# Patient Record
Sex: MALE | Race: WHITE | NOT HISPANIC OR LATINO | Employment: FULL TIME | URBAN - METROPOLITAN AREA
[De-identification: names, ages, dates, MRNs, and addresses within clinical notes are randomized per-mention and may not be internally consistent; named-entity substitution may affect disease eponyms.]

---

## 2017-01-03 ENCOUNTER — ALLSCRIPTS OFFICE VISIT (OUTPATIENT)
Dept: OTHER | Facility: OTHER | Age: 37
End: 2017-01-03

## 2017-01-03 DIAGNOSIS — R07.89 OTHER CHEST PAIN: ICD-10-CM

## 2017-01-12 ENCOUNTER — HOSPITAL ENCOUNTER (OUTPATIENT)
Dept: NON INVASIVE DIAGNOSTICS | Facility: HOSPITAL | Age: 37
Discharge: HOME | End: 2017-01-12
Attending: INTERNAL MEDICINE
Payer: COMMERCIAL

## 2017-01-12 DIAGNOSIS — R07.89 OTHER CHEST PAIN: ICD-10-CM

## 2017-05-05 ENCOUNTER — ALLSCRIPTS OFFICE VISIT (OUTPATIENT)
Dept: OTHER | Facility: OTHER | Age: 37
End: 2017-05-05

## 2017-05-08 ENCOUNTER — ALLSCRIPTS OFFICE VISIT (OUTPATIENT)
Dept: OTHER | Facility: OTHER | Age: 37
End: 2017-05-08

## 2018-01-13 VITALS
SYSTOLIC BLOOD PRESSURE: 132 MMHG | HEIGHT: 66 IN | BODY MASS INDEX: 44.22 KG/M2 | OXYGEN SATURATION: 96 % | DIASTOLIC BLOOD PRESSURE: 70 MMHG | HEART RATE: 105 BPM | WEIGHT: 275.13 LBS

## 2018-01-13 VITALS
HEIGHT: 73 IN | SYSTOLIC BLOOD PRESSURE: 112 MMHG | WEIGHT: 262 LBS | HEART RATE: 94 BPM | DIASTOLIC BLOOD PRESSURE: 74 MMHG | BODY MASS INDEX: 34.72 KG/M2

## 2018-01-14 VITALS
HEART RATE: 83 BPM | HEIGHT: 73 IN | WEIGHT: 261 LBS | SYSTOLIC BLOOD PRESSURE: 134 MMHG | DIASTOLIC BLOOD PRESSURE: 82 MMHG | BODY MASS INDEX: 34.59 KG/M2

## 2018-02-16 ENCOUNTER — OFFICE VISIT (OUTPATIENT)
Dept: CARDIOLOGY CLINIC | Facility: CLINIC | Age: 38
End: 2018-02-16
Payer: COMMERCIAL

## 2018-02-16 VITALS
SYSTOLIC BLOOD PRESSURE: 122 MMHG | HEIGHT: 73 IN | DIASTOLIC BLOOD PRESSURE: 82 MMHG | HEART RATE: 84 BPM | BODY MASS INDEX: 37.93 KG/M2 | OXYGEN SATURATION: 98 % | WEIGHT: 286.2 LBS

## 2018-02-16 DIAGNOSIS — R07.89 ATYPICAL CHEST PAIN: ICD-10-CM

## 2018-02-16 DIAGNOSIS — E66.9 OBESITY (BMI 35.0-39.9 WITHOUT COMORBIDITY): ICD-10-CM

## 2018-02-16 DIAGNOSIS — F41.9 ANXIETY: ICD-10-CM

## 2018-02-16 DIAGNOSIS — R00.0 TACHYCARDIA: Primary | ICD-10-CM

## 2018-02-16 PROCEDURE — 99213 OFFICE O/P EST LOW 20 MIN: CPT | Performed by: INTERNAL MEDICINE

## 2018-02-16 PROCEDURE — 93000 ELECTROCARDIOGRAM COMPLETE: CPT | Performed by: INTERNAL MEDICINE

## 2018-02-16 RX ORDER — FEBUXOSTAT 80 MG/1
1 TABLET, FILM COATED ORAL DAILY
COMMUNITY
Start: 2017-01-03

## 2018-02-16 NOTE — PROGRESS NOTES
Progress Note - Cardiology Office  Ariadna Pyle 42 y o  male MRN: [id number]   Encounter: [id number]    Assessment/Plan   1  Atypical noncoronary chest pain/neck pain which is most likely secondary to patient started lifting weights and is more of a chest soreness than any chest pain  No shortness of breath or any other symptoms associated with  Less likely to be cardiac in and isn't in view of maximal echo stress EKG test  Patient educated about chest pain associated with cardiac conditions  2  Tachycardia  Most likely anxiety driven and change in lifestyle  Patient counseled to lose weight and patient is monitoring his heart rate himself  3  Health maintenance and family history of coronary artery disease  Patient's cholesterol, HbA1c and TSH and other labs are reviewed with him  Lifestyle changes recommended including losing weight and healthy eating  4  Anxiety  As per PMD  5  Obesity  Patient has BMI of 37  Advised to lose weight  Discussed at length all his questions answered  Follow-up in 1 year or as needed    Counseling :  A description of the counseling  Patient was given instructions about chest pain, obesity, educated about angina  All his questions answered  Goals and Barriers  Want to lose 20-30 lb  There is no barrier  Patient's ability to self care: Yes  Medication side effect reviewed with patient in detail and all their questions answered to their satisfaction  HPI :     Ariadna Pyle is a 42y o  year old male who came for follow up  Patient has a past medical history significant for chest pain status post maximally negative exercise stress test in May 2016, history of gout, anxiety who is here for follow-up and he noted his heart rate was high  He recently started lifting weights and has chest soreness but no shortness of breath or any other significant complaint  He had history of anxiety problems and even had a panic attack last year  Currently he is feeling better   He admits is back to drinking now 1-2 beers a day which he promises to decrease  No other significant complaint  5/8/2017Patient has been doing well  No more chest pain  Did not do Holter monitor, but patient bought himself an apple watch, to monitor the heart  Says heart rate remains 70 to 80s  With exercise goes to 130 to 140  Try to lose weight  Denies any chest pain or any shortness of breath, no fever, no chills, no other significant complaint   02/16/2018  Patient came for follow-up  He is doing much better  He was working 10-12 hours a day  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  Having some neck pain  Sometime it goes to chest but no shortness of breath  He can lift heavy loads without any problems  No PND no orthopnea no nausea no vomiting  He is less anxious  Able to lose weight  No other significant complaint today  Review of Systems   Musculoskeletal: Positive for neck pain and neck stiffness  Psychiatric/Behavioral: The patient is nervous/anxious  All other systems reviewed and are negative  Historical Information   Past Medical History:   Diagnosis Date    Tachycardia      Past Surgical History:   Procedure Laterality Date    EYE SURGERY      HERNIA REPAIR       History   Alcohol Use    Yes     Comment: occasional      History   Drug use: Unknown     History   Smoking Status    Former Smoker   Smokeless Tobacco    Never Used     Family History:   Family History   Problem Relation Age of Onset    Heart attack Father        Meds/Allergies     Allergies   Allergen Reactions    Sulfamethoxazole-Trimethoprim        Current Outpatient Prescriptions:     febuxostat (ULORIC) 80 MG TABS, Take 1 tablet by mouth daily, Disp: , Rfl:     Vitals: Blood pressure 122/82, pulse 84, height 6' 1" (1 854 m), weight 130 kg (286 lb 3 2 oz), SpO2 98 %  Body mass index is 37 76 kg/m²      BP Readings from Last 3 Encounters:   02/16/18 122/82   05/08/17 112/74   05/05/17 134/82 Physical Exam:  Physical Exam    Neurologic:  Alert & oriented x 3, no new focal deficits, Not in any acute distress,  Constitutional:  Well developed, well nourished, non-toxic appearance   Eyes:  Pupil equal and reacting to light, conjunctiva normal, No JVP, No LNP   HENT:  Atraumatic, oropharynx moist, Neck- normal range of motion, no tenderness,  Neck supple   Respiratory:  Bilateral air entry, mostly clear to auscultation  Cardiovascular: S1-S2 regular with no gallops or murmur  GI:  Soft, nondistended, normal bowel sounds, nontender, no hepatosplenomegaly appreciated  Musculoskeletal:  No edema, no tenderness, no deformities  Skin:  Well hydrated, no rash   Lymphatic:  No lymphadenopathy noted   Extremities:  No edema and distal pulses are present    Diagnostic Studies Review Cardio:    Lab Review: He has a blood test done in May 2015 which shows cholesterol 158 and HDL 44 and  and asked to be A1c was 5 7 and his LFTs were acceptable  At that time his TSH was 1 25 and vitamin D 39  Uric acid 4 8   Stress Test: Stress test done in February 2016 was maximally and active with excellent access capacity  Echocardiogram/RICHARD: Echocardiogram 6/22/2016 shows normal LV systolic function EF 97-57%  Trace MR and PA pressure 35 40 mmHg  ECG Report:   Comparison to prior ECGs: there was an interval change in the ECG  Sinus tachycardia heart rate 105 bpm  No other significant change  As compared to old EKG heart rate is fast   Repeat EKG 02/16/2018 shows normal sinus rhythm heart rate 82 beats per minute  No significant ST changes  Dr German Ramey MD [facility] [place]      "This note has been constructed using a voice recognition system  Therefore there may be syntax, spelling, and/or grammatical errors   Please call if you have any questions  "

## 2018-02-16 NOTE — PATIENT INSTRUCTIONS
Angina   AMBULATORY CARE:   Angina  is pain, pressure, or tightness that is usually felt in your chest  It is caused by decreased blood flow and oxygen to your heart  Angina is usually caused by atherosclerosis (hardening of the arteries)  Chest pain may come on when you are stressed or do physical activities, such as walking or exercising  If left untreated, angina may get worse, increase your risk for a heart attack, or become life-threatening  Common symptoms include the following:   · Pressure, tightness, or pain in your neck, jaw, shoulder, or back    · Pain or numbness in either arm    · Discomfort that feels like heartburn    · Shortness of breath, sweating, nausea, or lightheadedness  Call 911 if:   · You have any of the following signs of a heart attack:      ¨ Squeezing, pressure, or pain in your chest that lasts longer than 5 minutes or returns    ¨ Discomfort or pain in your back, neck, jaw, stomach, or arm     ¨ Trouble breathing    ¨ Nausea or vomiting    ¨ Lightheadedness or a sudden cold sweat, especially with chest pain or trouble breathing    · You have chest pain that does not go away after you take medicine as directed  · You lose feeling in your face, arms, or legs, or you suddenly feel weak  Seek care immediately if:   · Your angina is happening more frequently, lasting longer, or causing worse pain  Contact your healthcare provider if:   · You are dizzy or nauseated after you take your medicine  · You have shortness of breath at rest     · You have new or worse swelling in your feet or ankles  · You have questions or concerns about your condition or care  Treatment for angina  may include any of the following  Take your medicine as directed  Call your healthcare provider if you think your medicine is not helping or if you have side effects  Tell him if you are allergic to any medicine  Keep a list of the medicines, vitamins, and herbs you take   Include the amounts, and when and why you take them  Bring the list or the pill bottles to follow-up visits  Carry your medicine list with you in case of an emergency  · Antiplatelets , such as aspirin, help prevent blood clots  Take your antiplatelet medicine exactly as directed  These medicines make it more likely for you to bleed or bruise  If you are told to take aspirin, do not take acetaminophen or ibuprofen instead  · Blood thinners  keep clots from forming in your blood  Clots may cause heart attacks, strokes, or death  This medicine makes it more likely for you to bleed or bruise  · Other medicines  may be given to open the arteries to your heart, slow your heartbeat, or decrease your blood pressure or cholesterol  · Do not take certain medicines without asking your healthcare provider first   These include NSAIDs, herbal or vitamin supplements, or hormones (estrogen or progestin)  · Angioplasty and stenting  help open the coronary arteries and allow blood to flow to the heart  Ask for more information about these procedures  · Coronary artery bypass graft (CABG) , or open heart surgery, can improve blood flow to the heart  This will help decrease your chest pain and prevent a heart attack  Manage angina:   · Do not smoke  Nicotine and other chemicals in cigarettes and cigars can cause heart and lung damage  Ask your healthcare provider for information if you currently smoke and need help to quit  E-cigarettes or smokeless tobacco still contain nicotine  Talk to your healthcare provider before you use these products  · Maintain a healthy weight  When you weigh more than is healthy for you, your heart must work harder  You are at higher risk for serious health problems if you are overweight  Ask your healthcare provider how much you should weigh  Ask him to help you create a weight loss plan if you are overweight  · Ask about activity  Your healthcare provider will tell you which activities to limit or avoid  Ask about the best exercise plan for you  · Eat heart-healthy foods  Include fresh fruits and vegetables in your meal plan  Choose low-fat foods, such as skim or 1% fat milk, low-fat cheese and yogurt, fish, chicken (without skin), and lean meats  Eat two 4-ounce servings of fish high in omega-3 fats each week, such as salmon, fresh tuna, and herring  Do not eat foods that are high in sodium, such as canned foods, potato chips, salty snacks, and cold cuts  Put less table salt on your food  · Avoid activities that cause angina  Pay attention to your symptoms and find out what seems to make your angina worse  · Ask if you should have a flu vaccine  The flu vaccine will decrease your risk for an infection  An infection can put more stress on your heart and worsen your angina  Follow up with your healthcare provider as directed:  Write down your questions so you remember to ask them during your visits  © 2017 [employer]  Information is for End User's use only and may not be sold, redistributed or otherwise used for commercial purposes  All illustrations and images included in CareNotes® are the copyrighted property of [employer]  or [employer]  The above information is an  only  It is not intended as medical advice for individual conditions or treatments  Talk to your doctor, nurse or pharmacist before following any medical regimen to see if it is safe and effective for you  Obesity   AMBULATORY CARE:   Obesity  is when your body mass index (BMI) is greater than 30  Your healthcare provider will use your height and weight to measure your BMI  The risks of obesity include  many health problems, such as injuries or physical disability   You may need tests to check for the following:  · Diabetes     · High blood pressure or high cholesterol     · Heart disease     · Gallbladder or liver disease     · Cancer of the colon, breast, prostate, liver, or kidney · Sleep apnea     · Arthritis or gout  Seek care immediately if:   · You have a severe headache, confusion, or difficulty speaking  · You have weakness on one side of your body  · You have chest pain, sweating, or shortness of breath  Contact your healthcare provider if:   · You have symptoms of gallbladder or liver disease, such as pain in your upper abdomen  · You have knee or hip pain and discomfort while walking  · You have symptoms of diabetes, such as intense hunger and thirst, and frequent urination  · You have symptoms of sleep apnea, such as snoring or daytime sleepiness  · You have questions or concerns about your condition or care  Treatment for obesity  focuses on helping you lose weight to improve your health  Even a small decrease in BMI can reduce the risk for many health problems  Your healthcare provider will help you set a weight-loss goal   · Lifestyle changes  are the first step in treating obesity  These include making healthy food choices and getting regular physical activity  Your healthcare provider may suggest a weight-loss program that involves coaching, education, and therapy  · Medicine  may help you lose weight when it is used with a healthy diet and physical activity  · Surgery  can help you lose weight if you are very obese and have other health problems  There are several types of weight-loss surgery  Ask your healthcare provider for more information  Be successful losing weight:   · Set small, realistic goals  An example of a small goal is to walk for 20 minutes 5 days a week  Anther goal is to lose 5% of your body weight  · Tell friends, family members, and coworkers about your goals  and ask for their support  Ask a friend to lose weight with you, or join a weight-loss support group  · Identify foods or triggers that may cause you to overeat , and find ways to avoid them  Remove tempting high-calorie foods from your home and workplace  Place a bowl of fresh fruit on your kitchen counter  If stress causes you to eat, then find other ways to cope with stress  · Keep a diary to track what you eat and drink  Also write down how many minutes of physical activity you do each day  Weigh yourself once a week and record it in your diary  Eating changes: You will need to eat 500 to 1,000 fewer calories each day than you currently eat to lose 1 to 2 pounds a week  The following changes will help you cut calories:  · Eat smaller portions  Use small plates, no larger than 9 inches in diameter  Fill your plate half full of fruits and vegetables  Measure your food using measuring cups until you know what a serving size looks like  · Eat 3 meals and 1 or 2 snacks each day  Plan your meals in advance  Isabel Perea and eat at home most of the time  Eat slowly  · Eat fruits and vegetables at every meal   They are low in calories and high in fiber, which makes you feel full  Do not add butter, margarine, or cream sauce to vegetables  Use herbs to season steamed vegetables  · Eat less fat and fewer fried foods  Eat more baked or grilled chicken and fish  These protein sources are lower in calories and fat than red meat  Limit fast food  Dress your salads with olive oil and vinegar instead of bottled dressing  · Limit the amount of sugar you eat  Do not drink sugary beverages  Limit alcohol  Activity changes:  Physical activity is good for your body in many ways  It helps you burn calories and build strong muscles  It decreases stress and depression, and improves your mood  It can also help you sleep better  Talk to your healthcare provider before you begin an exercise program   · Exercise for at least 30 minutes 5 days a week  Start slowly  Set aside time each day for physical activity that you enjoy and that is convenient for you   It is best to do both weight training and an activity that increases your heart rate, such as walking, bicycling, or swimming  · Find ways to be more active  Do yard work and housecleaning  Walk up the stairs instead of using elevators  Spend your leisure time going to events that require walking, such as outdoor festivals or fairs  This extra physical activity can help you lose weight and keep it off  Follow up with your healthcare provider as directed: You may need to meet with a dietitian  Write down your questions so you remember to ask them during your visits  © 2017 [employer] Information is for End User's use only and may not be sold, redistributed or otherwise used for commercial purposes  All illustrations and images included in CareNotes® are the copyrighted property of [employer]  or [employer]  The above information is an  only  It is not intended as medical advice for individual conditions or treatments  Talk to your doctor, nurse or pharmacist before following any medical regimen to see if it is safe and effective for you

## 2020-10-23 ENCOUNTER — HOSPITAL ENCOUNTER (EMERGENCY)
Facility: HOSPITAL | Age: 40
Discharge: HOME | End: 2020-10-23
Attending: EMERGENCY MEDICINE | Admitting: EMERGENCY MEDICINE
Payer: COMMERCIAL

## 2020-10-23 ENCOUNTER — APPOINTMENT (EMERGENCY)
Dept: CT IMAGING | Facility: HOSPITAL | Age: 40
End: 2020-10-23
Payer: COMMERCIAL

## 2020-10-23 VITALS
TEMPERATURE: 99.6 F | RESPIRATION RATE: 16 BRPM | OXYGEN SATURATION: 97 % | BODY MASS INDEX: 38.25 KG/M2 | SYSTOLIC BLOOD PRESSURE: 134 MMHG | HEART RATE: 90 BPM | WEIGHT: 289.9 LBS | DIASTOLIC BLOOD PRESSURE: 84 MMHG

## 2020-10-23 DIAGNOSIS — R31.9 HEMATURIA: Primary | ICD-10-CM

## 2020-10-23 DIAGNOSIS — N39.0 UTI (URINARY TRACT INFECTION): ICD-10-CM

## 2020-10-23 LAB
ALBUMIN SERPL BCP-MCNC: 4.1 G/DL (ref 3.5–5)
ALP SERPL-CCNC: 69 U/L (ref 46–116)
ALT SERPL W P-5'-P-CCNC: 32 U/L (ref 12–78)
ANION GAP SERPL CALCULATED.3IONS-SCNC: 10 MMOL/L (ref 4–13)
AST SERPL W P-5'-P-CCNC: 27 U/L (ref 5–45)
BACTERIA UR QL AUTO: ABNORMAL /HPF
BASOPHILS # BLD AUTO: 0.02 THOUSANDS/ΜL (ref 0–0.1)
BASOPHILS NFR BLD AUTO: 0 % (ref 0–1)
BILIRUB SERPL-MCNC: 0.64 MG/DL (ref 0.2–1)
BILIRUB UR QL STRIP: NEGATIVE
BUN SERPL-MCNC: 16 MG/DL (ref 5–25)
CALCIUM SERPL-MCNC: 8.7 MG/DL (ref 8.3–10.1)
CHLORIDE SERPL-SCNC: 102 MMOL/L (ref 100–108)
CLARITY UR: ABNORMAL
CO2 SERPL-SCNC: 29 MMOL/L (ref 21–32)
COLOR UR: YELLOW
CREAT SERPL-MCNC: 1.32 MG/DL (ref 0.6–1.3)
EOSINOPHIL # BLD AUTO: 0.03 THOUSAND/ΜL (ref 0–0.61)
EOSINOPHIL NFR BLD AUTO: 0 % (ref 0–6)
ERYTHROCYTE [DISTWIDTH] IN BLOOD BY AUTOMATED COUNT: 12.7 % (ref 11.6–15.1)
GFR SERPL CREATININE-BSD FRML MDRD: 66 ML/MIN/1.73SQ M
GLUCOSE SERPL-MCNC: 108 MG/DL (ref 65–140)
GLUCOSE UR STRIP-MCNC: NEGATIVE MG/DL
HCT VFR BLD AUTO: 43.4 % (ref 36.5–49.3)
HGB BLD-MCNC: 14.5 G/DL (ref 12–17)
HGB UR QL STRIP.AUTO: ABNORMAL
IMM GRANULOCYTES # BLD AUTO: 0.07 THOUSAND/UL (ref 0–0.2)
IMM GRANULOCYTES NFR BLD AUTO: 1 % (ref 0–2)
KETONES UR STRIP-MCNC: NEGATIVE MG/DL
LEUKOCYTE ESTERASE UR QL STRIP: ABNORMAL
LIPASE SERPL-CCNC: 81 U/L (ref 73–393)
LYMPHOCYTES # BLD AUTO: 1.85 THOUSANDS/ΜL (ref 0.6–4.47)
LYMPHOCYTES NFR BLD AUTO: 13 % (ref 14–44)
MCH RBC QN AUTO: 31 PG (ref 26.8–34.3)
MCHC RBC AUTO-ENTMCNC: 33.4 G/DL (ref 31.4–37.4)
MCV RBC AUTO: 93 FL (ref 82–98)
MONOCYTES # BLD AUTO: 2 THOUSAND/ΜL (ref 0.17–1.22)
MONOCYTES NFR BLD AUTO: 15 % (ref 4–12)
NEUTROPHILS # BLD AUTO: 9.79 THOUSANDS/ΜL (ref 1.85–7.62)
NEUTS SEG NFR BLD AUTO: 71 % (ref 43–75)
NITRITE UR QL STRIP: POSITIVE
NON-SQ EPI CELLS URNS QL MICRO: ABNORMAL /HPF
NRBC BLD AUTO-RTO: 0 /100 WBCS
PH UR STRIP.AUTO: 5.5 [PH]
PLATELET # BLD AUTO: 159 THOUSANDS/UL (ref 149–390)
PMV BLD AUTO: 11.1 FL (ref 8.9–12.7)
POTASSIUM SERPL-SCNC: 3.9 MMOL/L (ref 3.5–5.3)
PROT SERPL-MCNC: 7.3 G/DL (ref 6.4–8.2)
PROT UR STRIP-MCNC: ABNORMAL MG/DL
RBC # BLD AUTO: 4.68 MILLION/UL (ref 3.88–5.62)
RBC #/AREA URNS AUTO: ABNORMAL /HPF
SODIUM SERPL-SCNC: 141 MMOL/L (ref 136–145)
SP GR UR STRIP.AUTO: >=1.03 (ref 1–1.03)
UROBILINOGEN UR QL STRIP.AUTO: 0.2 E.U./DL
WBC # BLD AUTO: 13.76 THOUSAND/UL (ref 4.31–10.16)
WBC #/AREA URNS AUTO: ABNORMAL /HPF

## 2020-10-23 PROCEDURE — 85025 COMPLETE CBC W/AUTO DIFF WBC: CPT | Performed by: EMERGENCY MEDICINE

## 2020-10-23 PROCEDURE — 87591 N.GONORRHOEAE DNA AMP PROB: CPT | Performed by: EMERGENCY MEDICINE

## 2020-10-23 PROCEDURE — 81001 URINALYSIS AUTO W/SCOPE: CPT | Performed by: EMERGENCY MEDICINE

## 2020-10-23 PROCEDURE — 74176 CT ABD & PELVIS W/O CONTRAST: CPT

## 2020-10-23 PROCEDURE — 87077 CULTURE AEROBIC IDENTIFY: CPT | Performed by: EMERGENCY MEDICINE

## 2020-10-23 PROCEDURE — 87186 SC STD MICRODIL/AGAR DIL: CPT | Performed by: EMERGENCY MEDICINE

## 2020-10-23 PROCEDURE — 99284 EMERGENCY DEPT VISIT MOD MDM: CPT

## 2020-10-23 PROCEDURE — 99284 EMERGENCY DEPT VISIT MOD MDM: CPT | Performed by: EMERGENCY MEDICINE

## 2020-10-23 PROCEDURE — G1004 CDSM NDSC: HCPCS

## 2020-10-23 PROCEDURE — 87086 URINE CULTURE/COLONY COUNT: CPT | Performed by: EMERGENCY MEDICINE

## 2020-10-23 PROCEDURE — 87491 CHLMYD TRACH DNA AMP PROBE: CPT | Performed by: EMERGENCY MEDICINE

## 2020-10-23 PROCEDURE — 83690 ASSAY OF LIPASE: CPT | Performed by: EMERGENCY MEDICINE

## 2020-10-23 PROCEDURE — 80053 COMPREHEN METABOLIC PANEL: CPT | Performed by: EMERGENCY MEDICINE

## 2020-10-23 PROCEDURE — 36415 COLL VENOUS BLD VENIPUNCTURE: CPT | Performed by: EMERGENCY MEDICINE

## 2020-10-23 RX ORDER — TESTOSTERONE CYPIONATE 100 MG/ML
200 INJECTION, SOLUTION INTRAMUSCULAR ONCE
COMMUNITY

## 2020-10-23 RX ORDER — MELATONIN
2000 DAILY
COMMUNITY

## 2020-10-23 RX ORDER — CEPHALEXIN 250 MG/1
500 CAPSULE ORAL ONCE
Status: COMPLETED | OUTPATIENT
Start: 2020-10-23 | End: 2020-10-23

## 2020-10-23 RX ORDER — MULTIVIT WITH MINERALS/LUTEIN
1000 TABLET ORAL DAILY
COMMUNITY

## 2020-10-23 RX ORDER — CEPHALEXIN 500 MG/1
500 CAPSULE ORAL EVERY 6 HOURS SCHEDULED
Qty: 28 CAPSULE | Refills: 0 | Status: SHIPPED | OUTPATIENT
Start: 2020-10-23 | End: 2020-10-30

## 2020-10-23 RX ADMIN — CEPHALEXIN 500 MG: 250 CAPSULE ORAL at 19:59

## 2020-10-25 LAB
BACTERIA UR CULT: ABNORMAL
C TRACH DNA SPEC QL NAA+PROBE: NEGATIVE
N GONORRHOEA DNA SPEC QL NAA+PROBE: NEGATIVE

## 2020-12-10 ENCOUNTER — TRANSCRIBE ORDERS (OUTPATIENT)
Dept: ADMINISTRATIVE | Facility: HOSPITAL | Age: 40
End: 2020-12-10

## 2020-12-10 DIAGNOSIS — N28.9 KIDNEY FUNCTION ABNORMAL: Primary | ICD-10-CM

## 2020-12-12 ENCOUNTER — HOSPITAL ENCOUNTER (OUTPATIENT)
Dept: ULTRASOUND IMAGING | Facility: HOSPITAL | Age: 40
Discharge: HOME | End: 2020-12-12
Payer: COMMERCIAL

## 2020-12-12 DIAGNOSIS — N28.9 KIDNEY FUNCTION ABNORMAL: ICD-10-CM

## 2020-12-12 PROCEDURE — 76770 US EXAM ABDO BACK WALL COMP: CPT

## 2021-03-30 DIAGNOSIS — Z23 ENCOUNTER FOR IMMUNIZATION: ICD-10-CM

## 2022-01-03 PROCEDURE — 87636 SARSCOV2 & INF A&B AMP PRB: CPT | Performed by: INTERNAL MEDICINE

## 2022-04-12 ENCOUNTER — APPOINTMENT (OUTPATIENT)
Dept: RADIOLOGY | Facility: MEDICAL CENTER | Age: 42
End: 2022-04-12
Payer: COMMERCIAL

## 2022-04-12 VITALS
SYSTOLIC BLOOD PRESSURE: 149 MMHG | DIASTOLIC BLOOD PRESSURE: 88 MMHG | HEIGHT: 73 IN | WEIGHT: 303 LBS | HEART RATE: 105 BPM | BODY MASS INDEX: 40.16 KG/M2

## 2022-04-12 DIAGNOSIS — M54.50 LOW BACK PAIN, UNSPECIFIED BACK PAIN LATERALITY, UNSPECIFIED CHRONICITY, UNSPECIFIED WHETHER SCIATICA PRESENT: ICD-10-CM

## 2022-04-12 DIAGNOSIS — M54.50 LEFT LOW BACK PAIN, UNSPECIFIED CHRONICITY, UNSPECIFIED WHETHER SCIATICA PRESENT: Primary | ICD-10-CM

## 2022-04-12 PROCEDURE — 99203 OFFICE O/P NEW LOW 30 MIN: CPT | Performed by: PHYSICIAN ASSISTANT

## 2022-04-12 PROCEDURE — 72114 X-RAY EXAM L-S SPINE BENDING: CPT

## 2022-04-12 RX ORDER — PREDNISONE 10 MG/1
TABLET ORAL
Qty: 42 TABLET | Refills: 0 | Status: SHIPPED | OUTPATIENT
Start: 2022-04-12

## 2022-04-12 NOTE — PROGRESS NOTES
Patient Name:  Raya Alexander  MRN:  [id number]    Assessment & Plan     Left-sided lumbar spine pain with lumbar radiculitis  1  Referral to physical therapy  2  Prescription for prednisone taper  3  The knee activities as tolerated with modification to avoid pain  4  Follow-up in six weeks with primary care sports medicine  Chief Complaint     Low back pain    History of the Present Illness     Raya Alexander is a 35 y o  male who reports to the office today for evaluation of his lumbar spine  He notes an onset of pain approximately six months ago  He denies any specific injury or trauma  He does work in construction  He notes primarily left-sided axial lumbar spine pain  He states the pain worsens as the day progresses  He denies any radiation of the pain distally into the lower extremities  He does note altered sensation which radiates distally into the toes of the left lower extremity  He denies any weakness in the lower extremities  He denies any bowel or bladder dysfunction  No saddle paresthesias  He was prescribed naproxen initially by his PCP which did result in GI upset  No fevers or chills  Physical Exam     /88   Pulse 105   Ht 6' 1" (1 854 m)   Wt (!) 137 kg (303 lb)   BMI 39 98 kg/m²     Lumbar spine:  No gross deformity  No tenderness to palpation midline and paraspinal musculature  No tenderness to palpation bilateral SI joints  No tenderness to palpation piriformis musculature bilaterally  Sensation intact L2-S1 bilaterally with 5/5 strength  Negative straight leg raise bilaterally  Eyes: Anicteric sclerae  ENT: Trachea midline  Lungs: Normal respiratory effort  CV: Capillary refill is less than 2 seconds  Skin: Intact without erythema  Lymph: No palpable lymphadenopathy  Neuro: Sensation is grossly intact to light touch  Psych: Mood and affect are appropriate      Data Review     I have personally reviewed pertinent films in PACS, and my interpretation follows:    X-rays lumbar spine 4/12/22:  No acute osseous abnormalities  No fracture or spondylolisthesis evident  Multilevel degenerative changes most appreciated at L4-L5 and L5-S1  Past Medical History:   Diagnosis Date    Tachycardia        Past Surgical History:   Procedure Laterality Date    EYE SURGERY      HERNIA REPAIR         Allergies   Allergen Reactions    Sulfamethoxazole-Trimethoprim        Current Outpatient Medications on File Prior to Visit   Medication Sig Dispense Refill    Ascorbic Acid (vitamin C) 1000 MG tablet Take 1,000 mg by mouth daily      cholecalciferol (VITAMIN D3) 1,000 units tablet Take 2,000 Units by mouth daily      febuxostat (ULORIC) 80 MG TABS Take 1 tablet by mouth daily      testosterone cypionate (DEPO-TESTOSTERONE) 100 mg/mL IM injection Inject 200 mg into a muscle once One injection every 2 weeks  No current facility-administered medications on file prior to visit  Social History     Tobacco Use    Smoking status: Former Smoker    Smokeless tobacco: Never Used   Substance Use Topics    Alcohol use: Yes     Comment: occasional     Drug use: Never       Family History   Problem Relation Age of Onset    Heart attack Father     Heart attack Family        Review of Systems     As stated in the HPI  All other systems reviewed and are negative

## 2022-10-07 ENCOUNTER — OFFICE VISIT (OUTPATIENT)
Dept: FAMILY MEDICINE CLINIC | Facility: CLINIC | Age: 42
End: 2022-10-07
Payer: COMMERCIAL

## 2022-10-07 VITALS — HEART RATE: 98 BPM | BODY MASS INDEX: 38.76 KG/M2 | HEIGHT: 74 IN | OXYGEN SATURATION: 94 % | WEIGHT: 302 LBS

## 2022-10-07 DIAGNOSIS — M54.50 ACUTE LEFT-SIDED LOW BACK PAIN WITHOUT SCIATICA: ICD-10-CM

## 2022-10-07 DIAGNOSIS — Z13.29 SCREENING FOR THYROID DISORDER: ICD-10-CM

## 2022-10-07 DIAGNOSIS — M54.50 LEFT LOW BACK PAIN, UNSPECIFIED CHRONICITY, UNSPECIFIED WHETHER SCIATICA PRESENT: ICD-10-CM

## 2022-10-07 DIAGNOSIS — Z13.0 SCREENING FOR DEFICIENCY ANEMIA: ICD-10-CM

## 2022-10-07 DIAGNOSIS — E78.5 DYSLIPIDEMIA: ICD-10-CM

## 2022-10-07 DIAGNOSIS — M1A.09X0 CHRONIC GOUT OF MULTIPLE SITES, UNSPECIFIED CAUSE: Primary | ICD-10-CM

## 2022-10-07 DIAGNOSIS — Z13.1 SCREENING FOR DIABETES MELLITUS: ICD-10-CM

## 2022-10-07 DIAGNOSIS — N28.9 RENAL INSUFFICIENCY: ICD-10-CM

## 2022-10-07 DIAGNOSIS — E66.9 OBESITY (BMI 35.0-39.9 WITHOUT COMORBIDITY): ICD-10-CM

## 2022-10-07 PROCEDURE — 99214 OFFICE O/P EST MOD 30 MIN: CPT | Performed by: INTERNAL MEDICINE

## 2022-10-07 RX ORDER — PREDNISONE 10 MG/1
TABLET ORAL
Qty: 42 TABLET | Refills: 0 | Status: SHIPPED | OUTPATIENT
Start: 2022-10-07

## 2022-10-07 NOTE — ASSESSMENT & PLAN NOTE
Patient with low back pain and on the left side with some discomfort feeling in the left lower extremity    Will prescribe him tapering doses of the prednisone to which he has responded in the past   Recommend also patient takes some Tylenol heating pad and follow up

## 2022-10-07 NOTE — PROGRESS NOTES
Office Visit Note  10/07/22     Bedford Lombard Febbo 38 y o  male MRN: [id number]  : 1980    Assessment:     1  Chronic gout of multiple sites, unspecified cause  Assessment & Plan:  Patient with history of gout currently taking Uloric 80 mg daily  He did not have any episodes of gout in the recent past   Recommend patient to get uric acid levels also done  2  Left low back pain, unspecified chronicity, unspecified whether sciatica present  -     predniSONE 10 mg tablet; Take 6 tablets days 1&2, 5 tablets days 3&4, 4 tablets days 5&6, 3 tablets days 7&8, 2 tablets days 9&10, 1 tablet days 11&12    3  Acute left-sided low back pain without sciatica  Assessment & Plan:  Patient with low back pain and on the left side with some discomfort feeling in the left lower extremity  Will prescribe him tapering doses of the prednisone to which he has responded in the past   Recommend also patient takes some Tylenol heating pad and follow up       4  Renal insufficiency  Assessment & Plan:  Patient's creatinine level is 1 37  Will follow up with repeat lab done  5  Screening for deficiency anemia  -     CBC and Platelet; Future    6  Screening for thyroid disorder  -     TSH, 3rd generation with Free T4 reflex; Future    7  Screening for diabetes mellitus  -     Comprehensive metabolic panel; Future  -     HEMOGLOBIN A1C W/ EAG ESTIMATION; Future    8  Dyslipidemia  -     Lipid panel; Future    9  Obesity (BMI 35 0-39 9 without comorbidity)  Assessment & Plan:  Patient's BMI is 38 77  Discussed with the patient weight reduction  Diet and exercise were discussed          Discussion Summary and Plan: Today's care plan and medications were reviewed with patient in detail and all their questions answered to their satisfaction      Chief Complaint   Patient presents with    Back Pain     Patient states he bent over and threw his back out      Subjective:  Patient has come here for evaluation regarding pain in the left side back causing some discomfort and pain in the left lower extremity also  Denies tingling numbness sensation in the lower extremities  History of back problem in the past also resolved with steroid medications orally  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  Patient denies chest pains palpitations shortness of breath  The following portions of the patient's history were reviewed and updated as appropriate: allergies, current medications, past family history, past medical history, past social history, past surgical history and problem list     Review of Systems   Constitutional: Negative for chills and fever  HENT: Negative for ear pain and sore throat  Eyes: Negative for pain and visual disturbance  Respiratory: Negative for cough and shortness of breath  Cardiovascular: Negative for chest pain and palpitations  Gastrointestinal: Negative for abdominal pain and vomiting  Genitourinary: Negative for dysuria and hematuria  Musculoskeletal: Positive for arthralgias and back pain  Skin: Negative for color change and rash  Neurological: Negative for seizures and syncope  All other systems reviewed and are negative          Historical Information   Patient Active Problem List   Diagnosis    Atypical chest pain    Anxiety    Tachycardia    Obesity (BMI 35 0-39 9 without comorbidity)    Chronic gout of multiple sites    Acute low back pain    Renal insufficiency     Past Medical History:   Diagnosis Date    Tachycardia      Past Surgical History:   Procedure Laterality Date    EYE SURGERY      HERNIA REPAIR       Social History     Substance and Sexual Activity   Alcohol Use Yes    Comment: occasional      Social History     Substance and Sexual Activity   Drug Use Never     Social History     Tobacco Use   Smoking Status Former Smoker   Smokeless Tobacco Never Used     Family History   Problem Relation Age of Onset    Heart attack Father     Heart attack Family      Health Maintenance Due   Topic    Hepatitis C Screening     PT PLAN OF CARE     Depression Screening     HIV Screening     BMI: Followup Plan     Annual Physical     DTaP,Tdap,and Td Vaccines (1 - Tdap)    Influenza Vaccine (1)      Meds/Allergies       Current Outpatient Medications:     Ascorbic Acid (vitamin C) 1000 MG tablet, Take 1,000 mg by mouth daily, Disp: , Rfl:     cholecalciferol (VITAMIN D3) 1,000 units tablet, Take 2,000 Units by mouth daily, Disp: , Rfl:     febuxostat (ULORIC) 80 MG TABS, Take 1 tablet by mouth daily, Disp: , Rfl:     predniSONE 10 mg tablet, Take 6 tablets days 1&2, 5 tablets days 3&4, 4 tablets days 5&6, 3 tablets days 7&8, 2 tablets days 9&10, 1 tablet days 11&12, Disp: 42 tablet, Rfl: 0    testosterone cypionate (DEPO-TESTOSTERONE) 100 mg/mL IM injection, Inject 200 mg into a muscle once One injection every 2 weeks  (Patient not taking: Reported on 10/7/2022), Disp: , Rfl:       Objective:    Vitals:   Pulse 98   Ht 6' 2" (1 88 m)   Wt (!) 137 kg (302 lb)   SpO2 94%   BMI 38 77 kg/m²   Body mass index is 38 77 kg/m²  Vitals:    10/07/22 1434   Weight: (!) 137 kg (302 lb)       Physical Exam  Vitals and nursing note reviewed  Constitutional:       Appearance: Normal appearance  Cardiovascular:      Rate and Rhythm: Normal rate and regular rhythm  Heart sounds: Normal heart sounds  Pulmonary:      Effort: Pulmonary effort is normal       Breath sounds: Normal breath sounds  Abdominal:      General: Abdomen is flat  Palpations: Abdomen is soft  Musculoskeletal:      Right lower leg: No edema  Left lower leg: No edema  Comments: Tenderness in the lumbar spine area SLR about 30° on the left side  No other focal deficits appreciated   Neurological:      Mental Status: He is alert and oriented to person, place, and time  Lab Review   No visits with results within 2 Month(s) from this visit     Latest known visit with results is:   Community Orders on 01/03/2022   Component Date Value Ref Range Status    SARS-CoV-2 01/03/2022 Positive (A) Negative Final    INFLUENZA A PCR 01/03/2022 Negative  Negative Final    INFLUENZA B PCR 01/03/2022 Negative  Negative Final         Sylvester Mora        "This note has been constructed using a voice recognition system  Therefore there may be syntax, spelling, and/or grammatical errors   Please call if you have any questions  "

## 2022-10-07 NOTE — ASSESSMENT & PLAN NOTE
Patient with history of gout currently taking Uloric 80 mg daily  He did not have any episodes of gout in the recent past   Recommend patient to get uric acid levels also done

## 2022-10-07 NOTE — ASSESSMENT & PLAN NOTE
Patient's BMI is 38 77  Discussed with the patient weight reduction    Diet and exercise were discussed

## 2023-02-20 ENCOUNTER — OFFICE VISIT (OUTPATIENT)
Dept: FAMILY MEDICINE CLINIC | Facility: CLINIC | Age: 43
End: 2023-02-20

## 2023-02-20 ENCOUNTER — TELEPHONE (OUTPATIENT)
Dept: CARDIOLOGY CLINIC | Facility: CLINIC | Age: 43
End: 2023-02-20

## 2023-02-20 VITALS
SYSTOLIC BLOOD PRESSURE: 124 MMHG | HEART RATE: 94 BPM | OXYGEN SATURATION: 97 % | BODY MASS INDEX: 40.43 KG/M2 | WEIGHT: 315 LBS | HEIGHT: 74 IN | DIASTOLIC BLOOD PRESSURE: 86 MMHG

## 2023-02-20 DIAGNOSIS — Z13.29 SCREENING FOR THYROID DISORDER: ICD-10-CM

## 2023-02-20 DIAGNOSIS — R73.03 PRE-DIABETES: ICD-10-CM

## 2023-02-20 DIAGNOSIS — E78.5 DYSLIPIDEMIA: ICD-10-CM

## 2023-02-20 DIAGNOSIS — E66.01 CLASS 3 SEVERE OBESITY DUE TO EXCESS CALORIES WITHOUT SERIOUS COMORBIDITY WITH BODY MASS INDEX (BMI) OF 40.0 TO 44.9 IN ADULT (HCC): ICD-10-CM

## 2023-02-20 DIAGNOSIS — Z13.0 SCREENING FOR DEFICIENCY ANEMIA: ICD-10-CM

## 2023-02-20 DIAGNOSIS — N28.9 RENAL INSUFFICIENCY: ICD-10-CM

## 2023-02-20 DIAGNOSIS — M1A.09X0 CHRONIC GOUT OF MULTIPLE SITES, UNSPECIFIED CAUSE: ICD-10-CM

## 2023-02-20 DIAGNOSIS — R07.89 ATYPICAL CHEST PAIN: Primary | ICD-10-CM

## 2023-02-20 DIAGNOSIS — E66.9 OBESITY (BMI 35.0-39.9 WITHOUT COMORBIDITY): ICD-10-CM

## 2023-02-20 DIAGNOSIS — R00.0 TACHYCARDIA: ICD-10-CM

## 2023-02-20 DIAGNOSIS — B35.1 FUNGAL INFECTION OF NAIL: ICD-10-CM

## 2023-02-20 DIAGNOSIS — F41.9 ANXIETY: ICD-10-CM

## 2023-02-20 PROBLEM — E66.813 CLASS 3 SEVERE OBESITY DUE TO EXCESS CALORIES IN ADULT (HCC): Status: ACTIVE | Noted: 2023-02-20

## 2023-02-20 RX ORDER — FEBUXOSTAT 80 MG/1
80 TABLET, FILM COATED ORAL DAILY
Qty: 90 TABLET | Refills: 0 | Status: SHIPPED | OUTPATIENT
Start: 2023-02-20

## 2023-02-20 NOTE — ASSESSMENT & PLAN NOTE
Patient with fungal infection of the left big toe  We will check the labs with the liver enzymes are normal we will consider starting him on Lamisil

## 2023-02-20 NOTE — ASSESSMENT & PLAN NOTE
Patient with anxiety symptoms on and off currently not on any medications we will check the blood get the cardiac evaluation and then decide

## 2023-02-20 NOTE — PROGRESS NOTES
Office Visit Note  23     Bayron White 38 y o  male MRN: [id number]  : 1980    Assessment:     1  Chronic gout of multiple sites, unspecified cause  Assessment & Plan:  Patient with a history of gout currently taking Uloric he did not have any gout attacks in the recent past we will continue the same and get uric acid levels also done  Orders:  -     Uric acid; Future    2  Obesity (BMI 35 0-39 9 without comorbidity)    3  Anxiety  Assessment & Plan:  Patient with anxiety symptoms on and off currently not on any medications we will check the blood get the cardiac evaluation and then decide  4  Class 3 severe obesity due to excess calories without serious comorbidity with body mass index (BMI) of 40 0 to 44 9 in [facility])  Assessment & Plan:  Patient with a BMI of 40 83  He has been gradually gaining weight in the recent past   We will refer the patient to the weight management regarding the same meanwhile he can start cutting back on the calorie intake carbohydrate intake lifestyle modification  5  Renal insufficiency  Assessment & Plan:  Patient's creatinine was slightly on the higher side we will follow-up with repeat lab to make sure it is stable  6  Tachycardia  Assessment & Plan:  Patient noticing sometimes his heart rate goes up especially when he is anxious  As mentioned we will have evaluation done by the cardiologist      7  Fungal infection of nail  Assessment & Plan:  Patient with fungal infection of the left big toe  We will check the labs with the liver enzymes are normal we will consider starting him on Lamisil  8  Screening for thyroid disorder    9  Screening for deficiency anemia  -     CBC and differential; Future  -     TSH, 3rd generation with Free T4 reflex; Future; Expected date: 2023    10  Dyslipidemia  -     Lipid panel; Future    11  Pre-diabetes  -     Comprehensive metabolic panel;  Future  -     Hemoglobin A1C; Future; Expected date: 03/06/2023  -     UA w Reflex to Microscopic w Reflex to Culture; Future; Expected date: 02/20/2023    12  Atypical chest pain  Assessment & Plan:  Patient with atypical symptoms of discomfort in the chest and with no associated symptoms of palpitations sweating or shortness of breath with a family history of coronary artery disease  Exam is unremarkable we will refer the patient to the cardiologist for further evaluation including stress test done and follow-up  BMI Counseling: Body mass index is 40 83 kg/m²  The BMI is above normal  Nutrition recommendations include decreasing portion sizes, decreasing fast food intake, consuming healthier snacks, moderation in carbohydrate intake and reducing intake of cholesterol  Exercise recommendations include moderate physical activity 150 minutes/week  No pharmacotherapy was ordered  Patient referred to weight management  Rationale for BMI follow-up plan is due to patient being overweight or obese  Depression Screening and Follow-up Plan: Patient was screened for depression during today's encounter  They screened negative with a PHQ-2 score of 0  Discussion Summary and Plan: Today's care plan and medications were reviewed with patient in detail and all their questions answered to their satisfaction  Chief Complaint   Patient presents with   • Follow-up   • Weight Check   • Shortness of Breath      Subjective:  Patient is coming here for evaluation regarding symptoms of atypical pain in the chest discomfort like feeling on and off last for few minutes and clears up  Patient denies palpitations sweating shortness of breath during that time  He also has some anxiety symptoms on and off  He is sleeping all right  Complains of fungal toenail infection in the left foot area especially on the big toe  Patient also concerned about the weight he has been gaining in the recent past   Labs ordered in October not done yet    Family history of coronary artery disease mother had bypass surgery  Father  of a massive heart attack  The following portions of the patient's history were reviewed and updated as appropriate: allergies, current medications, past family history, past medical history, past social history, past surgical history and problem list     Review of Systems   Constitutional: Negative for chills and fever  HENT: Negative for ear pain and sore throat  Eyes: Negative for pain and visual disturbance  Respiratory: Negative for cough and shortness of breath  Cardiovascular: Negative for chest pain and palpitations  Atypical chest discomfort symptoms   Gastrointestinal: Negative for abdominal pain and vomiting  Genitourinary: Negative for dysuria and hematuria  Musculoskeletal: Positive for back pain  Negative for arthralgias  Skin: Negative for color change and rash  Neurological: Negative for seizures and syncope  All other systems reviewed and are negative          Historical Information   Patient Active Problem List   Diagnosis   • Atypical chest pain   • Anxiety   • Tachycardia   • Chronic gout of multiple sites   • Acute low back pain   • Renal insufficiency   • Class 3 severe obesity due to excess calories in [facility])   • Fungal infection of nail     Past Medical History:   Diagnosis Date   • Tachycardia      Past Surgical History:   Procedure Laterality Date   • EYE SURGERY     • HERNIA REPAIR       Social History     Substance and Sexual Activity   Alcohol Use Yes    Comment: occasional      Social History     Substance and Sexual Activity   Drug Use Never     Social History     Tobacco Use   Smoking Status Former   • Types: Cigarettes   • Passive exposure: Never   Smokeless Tobacco Never     Family History   Problem Relation Age of Onset   • Heart attack Father    • Heart attack Family      Health Maintenance Due   Topic   • Hepatitis C Screening    • PT PLAN OF CARE    • HIV Screening    • Annual Physical • DTaP,Tdap,and Td Vaccines (1 - Tdap)   • COVID-19 Vaccine (4 - Booster for [employer] series)   • Influenza Vaccine (1)      Meds/Allergies       Current Outpatient Medications:   •  Ascorbic Acid (vitamin C) 1000 MG tablet, Take 1,000 mg by mouth daily, Disp: , Rfl:   •  cholecalciferol (VITAMIN D3) 1,000 units tablet, Take 2,000 Units by mouth daily, Disp: , Rfl:   •  febuxostat (ULORIC) 80 MG TABS, Take 1 tablet by mouth daily, Disp: , Rfl:       Objective:    Vitals:   /86 (BP Location: Right arm, Patient Position: Sitting, Cuff Size: Large)   Pulse 94   Ht 6' 2" (1 88 m)   Wt (!) 144 kg (318 lb) Comment: 312lkb at home with out boots  SpO2 97%   BMI 40 83 kg/m²   Body mass index is 40 83 kg/m²  Vitals:    02/20/23 0732   Weight: (!) 144 kg (318 lb)       Physical Exam  Vitals and nursing note reviewed  Constitutional:       Appearance: Normal appearance  Cardiovascular:      Rate and Rhythm: Normal rate and regular rhythm  Heart sounds: Normal heart sounds  Pulmonary:      Effort: Pulmonary effort is normal       Breath sounds: Normal breath sounds  Chest:      Chest wall: No mass, deformity or tenderness  Abdominal:      Palpations: Abdomen is soft  Musculoskeletal:      Right lower leg: No edema  Left lower leg: No edema  Skin:     General: Skin is warm and dry  Neurological:      General: No focal deficit present  Mental Status: He is alert and oriented to person, place, and time  Lab Review   No visits with results within 2 Month(s) from this visit  Latest known visit with results is:   Community Orders on 01/03/2022   Component Date Value Ref Range Status   • SARS-CoV-2 01/03/2022 Positive (A)  Negative Final   • INFLUENZA A PCR 01/03/2022 Negative  Negative Final   • INFLUENZA B PCR 01/03/2022 Negative  Negative Final         Temitope Mora MD        "This note has been constructed using a voice recognition system  Therefore there may be syntax, spelling, and/or grammatical errors   Please call if you have any questions  "

## 2023-02-20 NOTE — ASSESSMENT & PLAN NOTE
Patient's creatinine was slightly on the higher side we will follow-up with repeat lab to make sure it is stable

## 2023-02-20 NOTE — ASSESSMENT & PLAN NOTE
Patient with a BMI of 40 83  He has been gradually gaining weight in the recent past   We will refer the patient to the weight management regarding the same meanwhile he can start cutting back on the calorie intake carbohydrate intake lifestyle modification

## 2023-02-20 NOTE — TELEPHONE ENCOUNTER
Pt was seen by Dr Negra Whiting in January and has 2 Atarax pills left, does not want to run out  Is Dr Rogena Spatz able to do this ?   (He has not established care with a PCP, encouraged him to do so )

## 2023-02-20 NOTE — ASSESSMENT & PLAN NOTE
Patient with atypical symptoms of discomfort in the chest and with no associated symptoms of palpitations sweating or shortness of breath with a family history of coronary artery disease  Exam is unremarkable we will refer the patient to the cardiologist for further evaluation including stress test done and follow-up

## 2023-02-20 NOTE — ASSESSMENT & PLAN NOTE
Patient noticing sometimes his heart rate goes up especially when he is anxious    As mentioned we will have evaluation done by the cardiologist

## 2023-02-20 NOTE — ASSESSMENT & PLAN NOTE
Patient with a history of gout currently taking Uloric he did not have any gout attacks in the recent past we will continue the same and get uric acid levels also done

## 2023-02-28 LAB
ALBUMIN SERPL-MCNC: 4.7 G/DL (ref 4–5)
ALBUMIN/GLOB SERPL: 2.2 {RATIO} (ref 1.2–2.2)
ALP SERPL-CCNC: 130 IU/L (ref 44–121)
ALT SERPL-CCNC: 79 IU/L (ref 0–44)
APPEARANCE UR: CLEAR
AST SERPL-CCNC: 52 IU/L (ref 0–40)
BACTERIA URNS QL MICRO: NORMAL
BASOPHILS # BLD AUTO: 0 X10E3/UL (ref 0–0.2)
BASOPHILS NFR BLD AUTO: 0 %
BILIRUB SERPL-MCNC: 0.4 MG/DL (ref 0–1.2)
BILIRUB UR QL STRIP: NEGATIVE
BUN SERPL-MCNC: 20 MG/DL (ref 6–24)
BUN/CREAT SERPL: 18 (ref 9–20)
CALCIUM SERPL-MCNC: 9.2 MG/DL (ref 8.7–10.2)
CASTS URNS QL MICRO: NORMAL /LPF
CHLORIDE SERPL-SCNC: 107 MMOL/L (ref 96–106)
CHOLEST SERPL-MCNC: 235 MG/DL (ref 100–199)
CO2 SERPL-SCNC: 21 MMOL/L (ref 20–29)
COLOR UR: YELLOW
CREAT SERPL-MCNC: 1.09 MG/DL (ref 0.76–1.27)
EGFR: 86 ML/MIN/1.73
EOSINOPHIL # BLD AUTO: 0.1 X10E3/UL (ref 0–0.4)
EOSINOPHIL NFR BLD AUTO: 1 %
EPI CELLS #/AREA URNS HPF: NORMAL /HPF (ref 0–10)
ERYTHROCYTE [DISTWIDTH] IN BLOOD BY AUTOMATED COUNT: 12.4 % (ref 11.6–15.4)
GLOBULIN SER-MCNC: 2.1 G/DL (ref 1.5–4.5)
GLUCOSE SERPL-MCNC: 160 MG/DL (ref 70–99)
GLUCOSE UR QL: NEGATIVE
HBA1C MFR BLD: 7.9 % (ref 4.8–5.6)
HCT VFR BLD AUTO: 44 % (ref 37.5–51)
HDLC SERPL-MCNC: 43 MG/DL
HGB BLD-MCNC: 15.2 G/DL (ref 13–17.7)
HGB UR QL STRIP: NEGATIVE
IMM GRANULOCYTES # BLD: 0.1 X10E3/UL (ref 0–0.1)
IMM GRANULOCYTES NFR BLD: 1 %
KETONES UR QL STRIP: NEGATIVE
LDLC SERPL CALC-MCNC: 166 MG/DL (ref 0–99)
LEUKOCYTE ESTERASE UR QL STRIP: NEGATIVE
LYMPHOCYTES # BLD AUTO: 2.4 X10E3/UL (ref 0.7–3.1)
LYMPHOCYTES NFR BLD AUTO: 29 %
MCH RBC QN AUTO: 30.9 PG (ref 26.6–33)
MCHC RBC AUTO-ENTMCNC: 34.5 G/DL (ref 31.5–35.7)
MCV RBC AUTO: 89 FL (ref 79–97)
MICRO URNS: NORMAL
MICRO URNS: NORMAL
MONOCYTES # BLD AUTO: 1.1 X10E3/UL (ref 0.1–0.9)
MONOCYTES NFR BLD AUTO: 13 %
NEUTROPHILS # BLD AUTO: 4.6 X10E3/UL (ref 1.4–7)
NEUTROPHILS NFR BLD AUTO: 56 %
NITRITE UR QL STRIP: NEGATIVE
PH UR STRIP: 5.5 [PH] (ref 5–7.5)
PLATELET # BLD AUTO: 168 X10E3/UL (ref 150–450)
POTASSIUM SERPL-SCNC: 4.6 MMOL/L (ref 3.5–5.2)
PROT SERPL-MCNC: 6.8 G/DL (ref 6–8.5)
PROT UR QL STRIP: NEGATIVE
RBC # BLD AUTO: 4.92 X10E6/UL (ref 4.14–5.8)
RBC #/AREA URNS HPF: NORMAL /HPF (ref 0–2)
SL AMB URINALYSIS REFLEX: NORMAL
SL AMB VLDL CHOLESTEROL CALC: 26 MG/DL (ref 5–40)
SODIUM SERPL-SCNC: 144 MMOL/L (ref 134–144)
SP GR UR: 1.02 (ref 1–1.03)
TRIGL SERPL-MCNC: 145 MG/DL (ref 0–149)
TSH SERPL DL<=0.005 MIU/L-ACNC: 1.75 UIU/ML (ref 0.45–4.5)
URATE SERPL-MCNC: 6 MG/DL (ref 3.8–8.4)
UROBILINOGEN UR STRIP-ACNC: 0.2 MG/DL (ref 0.2–1)
WBC # BLD AUTO: 8.3 X10E3/UL (ref 3.4–10.8)
WBC #/AREA URNS HPF: NORMAL /HPF (ref 0–5)

## 2023-03-08 ENCOUNTER — OFFICE VISIT (OUTPATIENT)
Dept: FAMILY MEDICINE CLINIC | Facility: CLINIC | Age: 43
End: 2023-03-08

## 2023-03-08 VITALS
SYSTOLIC BLOOD PRESSURE: 116 MMHG | BODY MASS INDEX: 39.4 KG/M2 | WEIGHT: 307 LBS | HEART RATE: 98 BPM | DIASTOLIC BLOOD PRESSURE: 72 MMHG | OXYGEN SATURATION: 100 % | HEIGHT: 74 IN

## 2023-03-08 DIAGNOSIS — M1A.09X0 CHRONIC GOUT OF MULTIPLE SITES, UNSPECIFIED CAUSE: ICD-10-CM

## 2023-03-08 DIAGNOSIS — E11.9 TYPE 2 DIABETES MELLITUS WITHOUT COMPLICATION, WITHOUT LONG-TERM CURRENT USE OF INSULIN (HCC): Primary | ICD-10-CM

## 2023-03-08 DIAGNOSIS — R74.8 ELEVATED LIVER ENZYMES: ICD-10-CM

## 2023-03-08 DIAGNOSIS — B35.1 FUNGAL INFECTION OF NAIL: ICD-10-CM

## 2023-03-08 DIAGNOSIS — R07.89 ATYPICAL CHEST PAIN: ICD-10-CM

## 2023-03-08 DIAGNOSIS — E66.9 OBESITY (BMI 35.0-39.9 WITHOUT COMORBIDITY): ICD-10-CM

## 2023-03-08 NOTE — PROGRESS NOTES
Office Visit Note  23     Fara Applebbo 38 y o  male MRN: [id number]  : 1980    Assessment:     1  Type 2 diabetes mellitus without complication, without long-term current use of insulin (Formerly [facility])  Assessment & Plan:    Lab Results   Component Value Date    HGBA1C 7 9 (H) 2023   Patient blood sugar 160 fasting A1c 7 9 admits to being noncompliant in the recent past drinking a lot of iced tea a lot of junk food  He does not want to go on medication he wants to be on a strict diet for next few months repeat the labs and if it is still high to then start him on medication  Emphasized regarding diet will refer to the diabetic nurse also  Orders:  -     Ambulatory referral to Diabetic Education - use to refer for diabetes group classes, individual diabetes education, medical nutrition therapy, device training; Future; Expected date: 2023  -     Comprehensive metabolic panel; Future; Expected date: 2023  -     Microalbumin / creatinine urine ratio; Future; Expected date: 2023    2  Atypical chest pain  Assessment & Plan:  Patient with atypical symptoms of chest pain currently history of CAD going to be seen by the cardiologist for further evaluation      3  Chronic gout of multiple sites, unspecified cause  Assessment & Plan:  No recent episodes of gout currently taking Uloric last uric acid level 6 0 we will continue with the same  4  Fungal infection of nail  Assessment & Plan:  Patient with liver enzyme elevation will not prescribe Lamisil for now      5  Obesity (BMI 35 0-39 9 without comorbidity)  Assessment & Plan:  BMI is at 39 42 according to patient he lost 11 pounds in few weeks time after he learned about his diabetes again emphasizing regarding diet exercise cutting back carbohydrate intake lifestyle modification      6   Elevated liver enzymes  Assessment & Plan:  Patient with liver enzyme elevation etiologies need to be determined most likely hepatic steatosis we will get an ultrasound of the liver also hepatitis C antibodies based on that make further decisions  Orders:  -     Hepatitis C antibody; Future  -     US abdomen limited; Future; Expected date: 03/08/2023             Discussion Summary and Plan: Today's care plan and medications were reviewed with patient in detail and all their questions answered to their satisfaction  Chief Complaint   Patient presents with   • Follow-up      Subjective:  Patient is coming here for a follow-up evaluation with regards to his lab work which had shown evidence of diabetes with a hemoglobin A1c of 7 9 with a fasting blood sugar of 160  His liver enzymes are also coming up high his alk phos is 130 AST is 52 and ALT 79  No family history that he is aware of has diabetes  Patient with atypical chest pain also is scheduled to be seen by the cardiologist next month  Patient admits to being noncompliant with the diet and drinking a lot of iced tea  No episodes of gout rest of the labs are unremarkable uric acid level is 6 0 creatinine is 1 09 normal   We will get a repeat blood work done for the sugar in 1 month patient already lost 11 pounds of weight after notifying about the blood results being abnormal   Patient does not want to go on medications now  He wants to be on a strict diet for next few months and we will repeat the blood test before he goes on any medication  His cholesterol level also came up high at 235 with an LDL of 166  Plan is to get repeat labs urine microalbumin ultrasound of the liver based on the findings we will make decisions  The following portions of the patient's history were reviewed and updated as appropriate: allergies, current medications, past family history, past medical history, past social history, past surgical history and problem list     Review of Systems   Constitutional: Negative for chills and fever  HENT: Negative for ear pain and sore throat      Eyes: Negative for pain and visual disturbance  Respiratory: Negative for cough and shortness of breath  Cardiovascular: Negative for chest pain and palpitations  Gastrointestinal: Negative for abdominal pain and vomiting  Genitourinary: Negative for dysuria and hematuria  Musculoskeletal: Negative for arthralgias and back pain  Skin: Negative for color change and rash  Neurological: Negative for seizures and syncope  All other systems reviewed and are negative          Historical Information   Patient Active Problem List   Diagnosis   • Atypical chest pain   • Anxiety   • Tachycardia   • Chronic gout of multiple sites   • Acute low back pain   • Renal insufficiency   • Fungal infection of nail   • Obesity (BMI 35 0-39 9 without comorbidity)   • Type 2 diabetes mellitus without complication, without long-term current use of insulin (HCC)   • Elevated liver enzymes     Past Medical History:   Diagnosis Date   • Tachycardia      Past Surgical History:   Procedure Laterality Date   • EYE SURGERY     • HERNIA REPAIR       Social History     Substance and Sexual Activity   Alcohol Use Yes    Comment: occasional      Social History     Substance and Sexual Activity   Drug Use Never     Social History     Tobacco Use   Smoking Status Former   • Types: Cigarettes   • Passive exposure: Never   Smokeless Tobacco Never     Family History   Problem Relation Age of Onset   • Heart attack Father    • Heart attack Family      Health Maintenance Due   Topic   • Hepatitis C Screening    • Kidney Health Evaluation: Microalbumin/Creatinine Ratio    • PT PLAN OF CARE    • Pneumococcal Vaccine: Pediatrics (0 to 5 Years) and At-Risk Patients (6 to 59 Years) (1 - PCV)   • DM Eye Exam    • HIV Screening    • Annual Physical    • DTaP,Tdap,and Td Vaccines (1 - Tdap)   • COVID-19 Vaccine (4 - Booster for Pfizer series)   • Influenza Vaccine (1)      Meds/Allergies       Current Outpatient Medications:   •  Ascorbic Acid (vitamin C) 1000 MG tablet, Take 1,000 mg by mouth daily, Disp: , Rfl:   •  cholecalciferol (VITAMIN D3) 1,000 units tablet, Take 2,000 Units by mouth daily, Disp: , Rfl:   •  febuxostat (ULORIC) 80 MG TABS, Take 1 tablet (80 mg total) by mouth daily, Disp: 90 tablet, Rfl: 0      Objective:    Vitals:   /72 (BP Location: Right arm, Patient Position: Sitting, Cuff Size: Standard)   Pulse 98   Ht 6' 2" (1 88 m)   Wt (!) 139 kg (307 lb)   SpO2 100%   BMI 39 42 kg/m²   Body mass index is 39 42 kg/m²  Vitals:    03/08/23 0728   Weight: (!) 139 kg (307 lb)       Physical Exam  Vitals and nursing note reviewed  Constitutional:       Appearance: Normal appearance  Cardiovascular:      Rate and Rhythm: Normal rate and regular rhythm  Pulses: no weak pulses          Dorsalis pedis pulses are 2+ on the right side and 2+ on the left side  Posterior tibial pulses are 2+ on the right side and 2+ on the left side  Heart sounds: Normal heart sounds  Pulmonary:      Effort: Pulmonary effort is normal       Breath sounds: Normal breath sounds  Abdominal:      Palpations: Abdomen is soft  Musculoskeletal:      Right lower leg: No edema  Left lower leg: No edema  Feet:      Right foot:      Skin integrity: No ulcer, skin breakdown, erythema, warmth, callus or dry skin  Left foot:      Skin integrity: No ulcer, skin breakdown, erythema, warmth, callus or dry skin  Neurological:      Mental Status: He is alert and oriented to person, place, and time  Patient's shoes and socks removed  Right Foot/Ankle   Right Foot Inspection  Skin Exam: skin normal and skin intact  No dry skin, no warmth, no callus, no erythema, no maceration, no abnormal color, no pre-ulcer, no ulcer and no callus  Toe Exam: ROM and strength within normal limits  No swelling, no tenderness, erythema and  no right toe deformity    Sensory   Monofilament testing: intact    Vascular  Capillary refills: < 3 seconds  The right DP pulse is 2+  The right PT pulse is 2+  Left Foot/Ankle  Left Foot Inspection  Skin Exam: skin normal  No dry skin, no warmth, no erythema, no maceration, normal color, no pre-ulcer, no ulcer and no callus  Toe Exam: ROM and strength within normal limits  No swelling, no tenderness, no erythema and no left toe deformity  Sensory   Monofilament testing: intact    Vascular  Capillary refills: < 3 seconds  The left DP pulse is 2+  The left PT pulse is 2+       Assign Risk Category  No deformity present  No loss of protective sensation  No weak pulses  Risk: 0     Lab Review   Orders Only on 02/23/2023   Component Date Value Ref Range Status   • White Blood Cell Count 02/23/2023 8 3  3 4 - 10 8 x10E3/uL Final   • Red Blood Cell Count 02/23/2023 4 92  4 14 - 5 80 x10E6/uL Final   • Hemoglobin 02/23/2023 15 2  13 0 - 17 7 g/dL Final   • HCT 02/23/2023 44 0  37 5 - 51 0 % Final   • MCV 02/23/2023 89  79 - 97 fL Final   • MCH 02/23/2023 30 9  26 6 - 33 0 pg Final   • MCHC 02/23/2023 34 5  31 5 - 35 7 g/dL Final   • RDW 02/23/2023 12 4  11 6 - 15 4 % Final   • Platelet Count 08/56/4222 168  150 - 450 x10E3/uL Final   • Neutrophils 02/23/2023 56  Not Estab  % Final   • Lymphocytes 02/23/2023 29  Not Estab  % Final   • Monocytes 02/23/2023 13  Not Estab  % Final   • Eosinophils 02/23/2023 1  Not Estab  % Final   • Basophils PCT 02/23/2023 0  Not Estab  % Final   • Neutrophils (Absolute) 02/23/2023 4 6  1 4 - 7 0 x10E3/uL Final   • Lymphocytes (Absolute) 02/23/2023 2 4  0 7 - 3 1 x10E3/uL Final   • Monocytes (Absolute) 02/23/2023 1 1 (H)  0 1 - 0 9 x10E3/uL Final   • Eosinophils (Absolute) 02/23/2023 0 1  0 0 - 0 4 x10E3/uL Final   • Basophils ABS 02/23/2023 0 0  0 0 - 0 2 x10E3/uL Final   • Immature Granulocytes 02/23/2023 1  Not Estab  % Final   • Immature Granulocytes (Absolute) 02/23/2023 0 1  0 0 - 0 1 x10E3/uL Final   • Glucose, Random 02/23/2023 160 (H)  70 - 99 mg/dL Final   • BUN 02/23/2023 20  6 - 24 mg/dL Final   • Creatinine 02/23/2023 1 09  0 76 - 1 27 mg/dL Final   • eGFR 02/23/2023 86  >59 mL/min/1 73 Final   • SL AMB BUN/CREATININE RATIO 02/23/2023 18  9 - 20 Final   • Sodium 02/23/2023 144  134 - 144 mmol/L Final   • Potassium 02/23/2023 4 6  3 5 - 5 2 mmol/L Final   • Chloride 02/23/2023 107 (H)  96 - 106 mmol/L Final   • CO2 02/23/2023 21  20 - 29 mmol/L Final   • CALCIUM 02/23/2023 9 2  8 7 - 10 2 mg/dL Final   • Protein, Total 02/23/2023 6 8  6 0 - 8 5 g/dL Final   • Albumin 02/23/2023 4 7  4 0 - 5 0 g/dL Final   • Globulin, Total 02/23/2023 2 1  1 5 - 4 5 g/dL Final   • Albumin/Globulin Ratio 02/23/2023 2 2  1 2 - 2 2 Final   • TOTAL BILIRUBIN 02/23/2023 0 4  0 0 - 1 2 mg/dL Final   • Alk Phos Isoenzymes 02/23/2023 130 (H)  44 - 121 IU/L Final   • AST 02/23/2023 52 (H)  0 - 40 IU/L Final   • ALT 02/23/2023 79 (H)  0 - 44 IU/L Final   • Specific Gravity 02/23/2023 1 020  1 005 - 1 030 Final   • Ph 02/23/2023 5 5  5 0 - 7 5 Final   • Color UA 02/23/2023 Yellow  Yellow Final   • Urine Appearance 02/23/2023 Clear  Clear Final   • Leukocyte Esterase 02/23/2023 Negative  Negative Final   • Protein 02/23/2023 Negative  Negative/Trace Final   • Glucose, 24 HR Urine 02/23/2023 Negative  Negative Final   • Ketone, Urine 02/23/2023 Negative  Negative Final   • Blood, Urine 02/23/2023 Negative  Negative Final   • Bilirubin, Urine 02/23/2023 Negative  Negative Final   • Urobilinogen Urine 02/23/2023 0 2  0 2 - 1 0 mg/dL Final   • SL AMB NITRITES URINE, QUAL  02/23/2023 Negative  Negative Final   • Microscopic Examination 02/23/2023 Comment   Final    Microscopic follows if indicated  • Microscopic Examination 02/23/2023 See below:   Final    Microscopic was indicated and was performed  • Urinalysis Reflex 02/23/2023 Comment   Final    This specimen will not reflex to a Urine Culture     • SL AMB WBC, URINE 02/23/2023 None seen  0 - 5 /hpf Final   • RBC, Urine 02/23/2023 None seen  0 - 2 /hpf Final   • Epithelial Cells (non renal) 02/23/2023 None seen  0 - 10 /hpf Final   • Casts 02/23/2023 None seen  None seen /lpf Final   • Bacteria, Urine 02/23/2023 None seen  None seen/Few Final   • Cholesterol, Total 02/23/2023 235 (H)  100 - 199 mg/dL Final   • Triglycerides 02/23/2023 145  0 - 149 mg/dL Final   • HDL 02/23/2023 43  >39 mg/dL Final   • VLDL Cholesterol Calculated 02/23/2023 26  5 - 40 mg/dL Final   • LDL Calculated 02/23/2023 166 (H)  0 - 99 mg/dL Final   • Hemoglobin A1C 02/23/2023 7 9 (H)  4 8 - 5 6 % Final    Comment:          Prediabetes: 5 7 - 6 4           Diabetes: >6 4           Glycemic control for adults with diabetes: <7 0     • TSH 02/23/2023 1 750  0 450 - 4 500 uIU/mL Final   • Uric Acid 02/23/2023 6 0  3 8 - 8 4 mg/dL Final               Therapeutic target for gout patients: <6 0         Sigrid Brian MD        "This note has been constructed using a voice recognition system  Therefore there may be syntax, spelling, and/or grammatical errors   Please call if you have any questions  "

## 2023-03-09 NOTE — ASSESSMENT & PLAN NOTE
BMI is at 39 42 according to patient he lost 11 pounds in few weeks time after he learned about his diabetes again emphasizing regarding diet exercise cutting back carbohydrate intake lifestyle modification

## 2023-03-09 NOTE — ASSESSMENT & PLAN NOTE
Patient with liver enzyme elevation etiologies need to be determined most likely hepatic steatosis we will get an ultrasound of the liver also hepatitis C antibodies based on that make further decisions

## 2023-03-09 NOTE — ASSESSMENT & PLAN NOTE
Lab Results   Component Value Date    HGBA1C 7 9 (H) 02/23/2023   Patient blood sugar 160 fasting A1c 7 9 admits to being noncompliant in the recent past drinking a lot of iced tea a lot of junk food  He does not want to go on medication he wants to be on a strict diet for next few months repeat the labs and if it is still high to then start him on medication  Emphasized regarding diet will refer to the diabetic nurse also  No

## 2023-03-09 NOTE — ASSESSMENT & PLAN NOTE
No recent episodes of gout currently taking Uloric last uric acid level 6 0 we will continue with the same

## 2023-03-09 NOTE — ASSESSMENT & PLAN NOTE
Patient with atypical symptoms of chest pain currently history of CAD going to be seen by the cardiologist for further evaluation

## 2023-03-13 ENCOUNTER — HOSPITAL ENCOUNTER (OUTPATIENT)
Dept: RADIOLOGY | Facility: HOSPITAL | Age: 43
Discharge: HOME | End: 2023-03-13
Attending: INTERNAL MEDICINE

## 2023-03-13 DIAGNOSIS — R74.8 ELEVATED LIVER ENZYMES: ICD-10-CM

## 2023-04-27 ENCOUNTER — HOSPITAL ENCOUNTER (OUTPATIENT)
Dept: CT IMAGING | Facility: HOSPITAL | Age: 43
Discharge: HOME | End: 2023-04-27
Attending: INTERNAL MEDICINE

## 2023-04-27 DIAGNOSIS — E78.2 MIXED HYPERLIPIDEMIA: ICD-10-CM

## 2023-05-08 ENCOUNTER — TELEPHONE (OUTPATIENT)
Dept: CARDIOLOGY CLINIC | Facility: CLINIC | Age: 43
End: 2023-05-08

## 2023-05-08 NOTE — TELEPHONE ENCOUNTER
----- Message from Preethi Head MD sent at 5/8/2023  2:27 PM EDT -----  His coronary calcium score was 21 which is at the 25th percentile for his gender and age and ethnicity  He does have very mild coronary disease  Continue with aggressive LDL control  I would like him to target an LDL below 70

## 2023-07-24 ENCOUNTER — TELEPHONE (OUTPATIENT)
Dept: FAMILY MEDICINE CLINIC | Facility: CLINIC | Age: 43
End: 2023-07-24

## 2023-07-25 DIAGNOSIS — E78.5 DYSLIPIDEMIA: ICD-10-CM

## 2023-07-25 DIAGNOSIS — E11.9 TYPE 2 DIABETES MELLITUS WITHOUT COMPLICATION, WITHOUT LONG-TERM CURRENT USE OF INSULIN (HCC): ICD-10-CM

## 2023-07-25 DIAGNOSIS — Z13.0 SCREENING FOR DEFICIENCY ANEMIA: ICD-10-CM

## 2023-07-25 DIAGNOSIS — Z13.29 SCREENING FOR THYROID DISORDER: Primary | ICD-10-CM

## 2023-08-17 ENCOUNTER — OFFICE VISIT (OUTPATIENT)
Dept: FAMILY MEDICINE CLINIC | Facility: CLINIC | Age: 43
End: 2023-08-17
Payer: COMMERCIAL

## 2023-08-17 VITALS
WEIGHT: 290 LBS | BODY MASS INDEX: 37.22 KG/M2 | DIASTOLIC BLOOD PRESSURE: 82 MMHG | HEART RATE: 71 BPM | SYSTOLIC BLOOD PRESSURE: 116 MMHG | OXYGEN SATURATION: 97 % | HEIGHT: 74 IN

## 2023-08-17 DIAGNOSIS — R74.8 ELEVATED LIVER ENZYMES: ICD-10-CM

## 2023-08-17 DIAGNOSIS — E11.9 TYPE 2 DIABETES MELLITUS WITHOUT COMPLICATION, WITHOUT LONG-TERM CURRENT USE OF INSULIN (HCC): ICD-10-CM

## 2023-08-17 DIAGNOSIS — R07.89 ATYPICAL CHEST PAIN: ICD-10-CM

## 2023-08-17 DIAGNOSIS — B35.1 FUNGAL INFECTION OF NAIL: ICD-10-CM

## 2023-08-17 DIAGNOSIS — F41.9 ANXIETY: ICD-10-CM

## 2023-08-17 DIAGNOSIS — E66.9 OBESITY (BMI 35.0-39.9 WITHOUT COMORBIDITY): ICD-10-CM

## 2023-08-17 DIAGNOSIS — M54.50 ACUTE LEFT-SIDED LOW BACK PAIN WITHOUT SCIATICA: Primary | ICD-10-CM

## 2023-08-17 DIAGNOSIS — M1A.09X0 CHRONIC GOUT OF MULTIPLE SITES, UNSPECIFIED CAUSE: ICD-10-CM

## 2023-08-17 DIAGNOSIS — N28.9 RENAL INSUFFICIENCY: ICD-10-CM

## 2023-08-17 PROCEDURE — 99214 OFFICE O/P EST MOD 30 MIN: CPT | Performed by: INTERNAL MEDICINE

## 2023-08-17 RX ORDER — METHYLPREDNISOLONE 4 MG/1
TABLET ORAL
Qty: 21 EACH | Refills: 0 | Status: SHIPPED | OUTPATIENT
Start: 2023-08-17

## 2023-08-17 RX ORDER — FEBUXOSTAT 80 MG/1
80 TABLET, FILM COATED ORAL DAILY
Qty: 90 TABLET | Refills: 0 | Status: SHIPPED | OUTPATIENT
Start: 2023-08-17

## 2023-08-17 NOTE — ASSESSMENT & PLAN NOTE
Patient was seen by the cardiologist regarding atypical chest pain and work-up with calcium score has been unremarkable with 25th percentile.   We will follow-up with lipid profile aggressive treatment for lowering the LDL

## 2023-08-17 NOTE — ASSESSMENT & PLAN NOTE
Lab Results   Component Value Date    HGBA1C 7.9 (H) 02/23/2023   Last hemoglobin A1c was 7.9 patient wanted to try strict diet cut back on this hi Stevan Billings thought of junk food we will follow-up with repeat hemoglobin A1c patient was concerned about going on medications he would rather do with the diet and see the response.

## 2023-08-17 NOTE — PROGRESS NOTES
Name: Nina Erickson      : 1980      MRN: [id number]  Encounter Provider: Mariana Rivas MD  Encounter Date: 2023   Encounter [facility] [address] [place], [place] [address]     1. Acute left-sided low back pain without sciatica  Assessment & Plan:  Patient with low back pain radiating down the left lower extremities will prescribe prednisone for now side effects explained his sugars might go up he might gain some weight also. We will also recommend patient to have physical therapy. If the symptoms are persisting we will have him seen by the orthopedic/spine physician for further evaluation and treatment. Orders:  -     methylPREDNISolone 4 MG tablet therapy pack; Use as directed on package  -     Ambulatory referral to Physical Therapy; Future    2. Anxiety    3. Chronic gout of multiple sites, unspecified cause  Assessment & Plan:  History of gout currently taking Uloric 80 mg daily appears to be controlling well. Orders:  -     Uric acid; Future  -     febuxostat (ULORIC) 80 MG TABS; Take 1 tablet (80 mg total) by mouth daily    4. Atypical chest pain  Assessment & Plan:  Patient was seen by the cardiologist regarding atypical chest pain and work-up with calcium score has been unremarkable with 25th percentile. We will follow-up with lipid profile aggressive treatment for lowering the LDL      5. Elevated liver enzymes  Assessment & Plan:  Patient with history of elevated liver enzymes ultrasound revealing hepatic steatosis weight losing weight it might help to bring down the steatosis and lowering the liver enzymes      6. Fungal infection of nail  Assessment & Plan:  Patient has developed fungal infection in the toenails again want care with the liver function and decide about the medication.       7. Obesity (BMI 35.0-39.9 without comorbidity)  Assessment & Plan:  Patient's BMI has come down from 39.42 from the last time to 37.23 again emphasized regarding diet exercise cutting back calorie intake carbohydrate intake lifestyle modification to lose weight. 8. Renal insufficiency  Assessment & Plan:  Patient's creatinine has fluctuated in the past we will follow-up with repeat lab      9. Type 2 diabetes mellitus without complication, without long-term current use of insulin ([facility])  Assessment & Plan:    Lab Results   Component Value Date    HGBA1C 7.9 (H) 02/23/2023   Last hemoglobin A1c was 7.9 patient wanted to try strict diet cut back on this hi Reena Johnson thought of junk food we will follow-up with repeat hemoglobin A1c patient was concerned about going on medications he would rather do with the diet and see the response. Subjective     Patient is coming here for evaluation regarding low back pain which she has developed few weeks back progressively increasing pain is shooting down the lower extremity on the left side associated with some tingling numbness sensation. Patient has taken Aleve but it did not make much difference. Patient also noticed some rectal bleeding on and off for the last few months. He had tried Aleve but did not make much difference. History of back pain in the past for which she had received prednisone. Patient last lab work had shown hemoglobin A1c of 7.9 he has been following strict diet without medication he wants to control the blood sugars lab work has been ordered he will be going for that soon. He was also seen by the cardiologist regarding atypical chest pain and a calcium score testing was done which was the 25th percentile. Patient was recommended to be on a strict diet try to lower the LDL levels which I am going to order. Review of Systems   Constitutional: Negative for chills, fatigue and fever. HENT: Negative for ear pain, sore throat and trouble swallowing. Eyes: Negative for pain and visual disturbance. Respiratory: Negative for cough and shortness of breath.     Cardiovascular: Negative for chest pain and palpitations. Gastrointestinal: Negative for abdominal pain, constipation, diarrhea and vomiting. Endocrine: Negative for polydipsia. Genitourinary: Negative for difficulty urinating, dysuria, hematuria and testicular pain. Musculoskeletal: Positive for back pain. Negative for arthralgias. Skin: Negative for color change and rash. Neurological: Negative for dizziness, seizures, syncope and headaches. Psychiatric/Behavioral: Negative for confusion and sleep disturbance. The patient is not nervous/anxious. All other systems reviewed and are negative.       Past Medical History:   Diagnosis Date   • Tachycardia      Past Surgical History:   Procedure Laterality Date   • EYE SURGERY     • HERNIA REPAIR       Family History   Problem Relation Age of Onset   • Heart attack Father    • Heart attack Family      Social History     Socioeconomic History   • Marital status: Single     Spouse name: None   • Number of children: None   • Years of education: None   • Highest education level: None   Occupational History   • None   Tobacco Use   • Smoking status: Former     Types: Cigarettes     Passive exposure: Never   • Smokeless tobacco: Never   Vaping Use   • Vaping Use: Never used   Substance and Sexual Activity   • Alcohol use: Yes     Comment: occasional    • Drug use: Never   • Sexual activity: None   Other Topics Concern   • None   Social History Narrative   • None     Social Determinants of Health     Financial Resource Strain: Not on file   Food Insecurity: Not on file   Transportation Needs: Not on file   Physical Activity: Not on file   Stress: Not on file   Social Connections: Not on file   Intimate Partner Violence: Not on file   Housing Stability: Not on file     Current Outpatient Medications on File Prior to Visit   Medication Sig   • Multiple Vitamin (ONE-A-DAY MENS PO) Take by mouth   • [DISCONTINUED] febuxostat (ULORIC) 80 MG TABS Take 1 tablet (80 mg total) by mouth daily   • [DISCONTINUED] Ascorbic Acid (vitamin C) 1000 MG tablet Take 1,000 mg by mouth daily (Patient not taking: Reported on 4/14/2023)   • [DISCONTINUED] cholecalciferol (VITAMIN D3) 1,000 units tablet Take 2,000 Units by mouth daily (Patient not taking: Reported on 4/14/2023)     Allergies   Allergen Reactions   • Sulfamethoxazole-Trimethoprim      Immunization History   Administered Date(s) Administered   • COVID-19 PFIZER VACCINE 0.3 ML IM 03/19/2021, 03/19/2021, 04/09/2021, 04/09/2021, 12/28/2021, 12/28/2021       Objective     /82   Pulse 71   Ht 6' 2" (1.88 m)   Wt 132 kg (290 lb)   SpO2 97%   BMI 37.23 kg/m²     Physical Exam  Vitals and nursing note reviewed. Constitutional:       Appearance: Normal appearance. He is obese. He is not ill-appearing. HENT:      Head: Normocephalic. Right Ear: External ear normal. There is no impacted cerumen. Left Ear: External ear normal. There is no impacted cerumen. Nose: Nose normal. No congestion or rhinorrhea. Mouth/Throat:      Pharynx: Oropharynx is clear. No posterior oropharyngeal erythema. Eyes:      General:         Right eye: No discharge. Left eye: No discharge. Conjunctiva/sclera: Conjunctivae normal.   Cardiovascular:      Rate and Rhythm: Normal rate and regular rhythm. Pulses:           Dorsalis pedis pulses are 2+ on the right side and 2+ on the left side. Posterior tibial pulses are 2+ on the right side and 2+ on the left side. Heart sounds: Normal heart sounds. Pulmonary:      Effort: Pulmonary effort is normal. No respiratory distress. Breath sounds: Normal breath sounds. Abdominal:      General: Bowel sounds are normal. There is no distension. Palpations: Abdomen is soft. There is no mass. Tenderness: There is no abdominal tenderness. Hernia: No hernia is present. Musculoskeletal:         General: No deformity. Right lower leg: No edema. Left lower leg: No edema. Comments: SLR about 30 degrees both side   Feet:      Right foot:      Skin integrity: No ulcer, skin breakdown, erythema, warmth, callus or dry skin. Left foot:      Skin integrity: No ulcer, skin breakdown, erythema, warmth, callus or dry skin. Skin:     Coloration: Skin is not jaundiced or pale. Findings: No rash. Neurological:      Mental Status: He is alert and oriented to person, place, and time. Sensory: No sensory deficit. Motor: No weakness. Gait: Gait normal.   Psychiatric:         Mood and Affect: Mood normal.         Behavior: Behavior normal.         Thought Content:  Thought content normal.         Judgment: Judgment normal.       Antonette Sanderson MD

## 2023-08-17 NOTE — ASSESSMENT & PLAN NOTE
Patient's BMI has come down from 39.42 from the last time to 37.23 again emphasized regarding diet exercise cutting back calorie intake carbohydrate intake lifestyle modification to lose weight.

## 2023-08-17 NOTE — ASSESSMENT & PLAN NOTE
Patient has developed fungal infection in the toenails again want care with the liver function and decide about the medication.

## 2023-08-17 NOTE — ASSESSMENT & PLAN NOTE
Patient with low back pain radiating down the left lower extremities will prescribe prednisone for now side effects explained his sugars might go up he might gain some weight also. We will also recommend patient to have physical therapy. If the symptoms are persisting we will have him seen by the orthopedic/spine physician for further evaluation and treatment.

## 2023-08-19 ENCOUNTER — APPOINTMENT (OUTPATIENT)
Dept: LAB | Facility: CLINIC | Age: 43
End: 2023-08-19
Payer: COMMERCIAL

## 2023-08-19 DIAGNOSIS — M1A.09X0 CHRONIC GOUT OF MULTIPLE SITES, UNSPECIFIED CAUSE: ICD-10-CM

## 2023-08-19 DIAGNOSIS — R73.03 PRE-DIABETES: ICD-10-CM

## 2023-08-19 DIAGNOSIS — Z13.1 SCREENING FOR DIABETES MELLITUS: ICD-10-CM

## 2023-08-19 DIAGNOSIS — E11.9 TYPE 2 DIABETES MELLITUS WITHOUT COMPLICATION, WITHOUT LONG-TERM CURRENT USE OF INSULIN (HCC): ICD-10-CM

## 2023-08-19 DIAGNOSIS — E78.5 DYSLIPIDEMIA: ICD-10-CM

## 2023-08-19 DIAGNOSIS — Z13.29 SCREENING FOR THYROID DISORDER: ICD-10-CM

## 2023-08-19 DIAGNOSIS — R74.8 ELEVATED LIVER ENZYMES: ICD-10-CM

## 2023-08-19 DIAGNOSIS — Z13.0 SCREENING FOR DEFICIENCY ANEMIA: ICD-10-CM

## 2023-08-19 LAB
ALBUMIN SERPL BCP-MCNC: 4.5 G/DL (ref 3.5–5)
ALP SERPL-CCNC: 72 U/L (ref 34–104)
ALT SERPL W P-5'-P-CCNC: 26 U/L (ref 7–52)
ANION GAP SERPL CALCULATED.3IONS-SCNC: 6 MMOL/L
AST SERPL W P-5'-P-CCNC: 26 U/L (ref 13–39)
BASOPHILS # BLD AUTO: 0.02 THOUSANDS/ÂΜL (ref 0–0.1)
BASOPHILS NFR BLD AUTO: 0 % (ref 0–1)
BILIRUB SERPL-MCNC: 0.66 MG/DL (ref 0.2–1)
BILIRUB UR QL STRIP: NEGATIVE
BUN SERPL-MCNC: 21 MG/DL (ref 5–25)
CALCIUM SERPL-MCNC: 9.3 MG/DL (ref 8.4–10.2)
CHLORIDE SERPL-SCNC: 107 MMOL/L (ref 96–108)
CHOLEST SERPL-MCNC: 175 MG/DL
CLARITY UR: CLEAR
CO2 SERPL-SCNC: 28 MMOL/L (ref 21–32)
COLOR UR: NORMAL
CREAT SERPL-MCNC: 1.23 MG/DL (ref 0.6–1.3)
CREAT UR-MCNC: 148 MG/DL
EOSINOPHIL # BLD AUTO: 0.06 THOUSAND/ÂΜL (ref 0–0.61)
EOSINOPHIL NFR BLD AUTO: 1 % (ref 0–6)
ERYTHROCYTE [DISTWIDTH] IN BLOOD BY AUTOMATED COUNT: 12.4 % (ref 11.6–15.1)
EST. AVERAGE GLUCOSE BLD GHB EST-MCNC: 128 MG/DL
GFR SERPL CREATININE-BSD FRML MDRD: 70 ML/MIN/1.73SQ M
GLUCOSE P FAST SERPL-MCNC: 101 MG/DL (ref 65–99)
GLUCOSE UR STRIP-MCNC: NEGATIVE MG/DL
HBA1C MFR BLD: 6.1 %
HCT VFR BLD AUTO: 41.5 % (ref 36.5–49.3)
HDLC SERPL-MCNC: 47 MG/DL
HGB BLD-MCNC: 14 G/DL (ref 12–17)
HGB UR QL STRIP.AUTO: NEGATIVE
IMM GRANULOCYTES # BLD AUTO: 0.05 THOUSAND/UL (ref 0–0.2)
IMM GRANULOCYTES NFR BLD AUTO: 1 % (ref 0–2)
KETONES UR STRIP-MCNC: NEGATIVE MG/DL
LDLC SERPL CALC-MCNC: 104 MG/DL (ref 0–100)
LEUKOCYTE ESTERASE UR QL STRIP: NEGATIVE
LYMPHOCYTES # BLD AUTO: 2.35 THOUSANDS/ÂΜL (ref 0.6–4.47)
LYMPHOCYTES NFR BLD AUTO: 31 % (ref 14–44)
MCH RBC QN AUTO: 30.8 PG (ref 26.8–34.3)
MCHC RBC AUTO-ENTMCNC: 33.7 G/DL (ref 31.4–37.4)
MCV RBC AUTO: 91 FL (ref 82–98)
MICROALBUMIN UR-MCNC: 11.7 MG/L (ref 0–20)
MICROALBUMIN/CREAT 24H UR: 8 MG/G CREATININE (ref 0–30)
MONOCYTES # BLD AUTO: 0.82 THOUSAND/ÂΜL (ref 0.17–1.22)
MONOCYTES NFR BLD AUTO: 11 % (ref 4–12)
NEUTROPHILS # BLD AUTO: 4.25 THOUSANDS/ÂΜL (ref 1.85–7.62)
NEUTS SEG NFR BLD AUTO: 56 % (ref 43–75)
NITRITE UR QL STRIP: NEGATIVE
NONHDLC SERPL-MCNC: 128 MG/DL
NRBC BLD AUTO-RTO: 0 /100 WBCS
PH UR STRIP.AUTO: 5 [PH]
PLATELET # BLD AUTO: 165 THOUSANDS/UL (ref 149–390)
PMV BLD AUTO: 11.6 FL (ref 8.9–12.7)
POTASSIUM SERPL-SCNC: 3.8 MMOL/L (ref 3.5–5.3)
PROT SERPL-MCNC: 6.9 G/DL (ref 6.4–8.4)
PROT UR STRIP-MCNC: NEGATIVE MG/DL
RBC # BLD AUTO: 4.55 MILLION/UL (ref 3.88–5.62)
SODIUM SERPL-SCNC: 141 MMOL/L (ref 135–147)
SP GR UR STRIP.AUTO: 1.02 (ref 1–1.03)
TRIGL SERPL-MCNC: 120 MG/DL
TSH SERPL DL<=0.05 MIU/L-ACNC: 2.07 UIU/ML (ref 0.45–4.5)
URATE SERPL-MCNC: 5.4 MG/DL (ref 3.5–8.5)
UROBILINOGEN UR STRIP-ACNC: <2 MG/DL
WBC # BLD AUTO: 7.55 THOUSAND/UL (ref 4.31–10.16)

## 2023-08-19 PROCEDURE — 81003 URINALYSIS AUTO W/O SCOPE: CPT

## 2023-08-19 PROCEDURE — 80061 LIPID PANEL: CPT

## 2023-08-19 PROCEDURE — 85025 COMPLETE CBC W/AUTO DIFF WBC: CPT

## 2023-08-19 PROCEDURE — 83036 HEMOGLOBIN GLYCOSYLATED A1C: CPT

## 2023-08-19 PROCEDURE — 36415 COLL VENOUS BLD VENIPUNCTURE: CPT

## 2023-08-19 PROCEDURE — 82570 ASSAY OF URINE CREATININE: CPT | Performed by: INTERNAL MEDICINE

## 2023-08-19 PROCEDURE — 80053 COMPREHEN METABOLIC PANEL: CPT

## 2023-08-19 PROCEDURE — 84443 ASSAY THYROID STIM HORMONE: CPT

## 2023-08-19 PROCEDURE — 82043 UR ALBUMIN QUANTITATIVE: CPT | Performed by: INTERNAL MEDICINE

## 2023-08-19 PROCEDURE — 84550 ASSAY OF BLOOD/URIC ACID: CPT

## 2023-08-19 PROCEDURE — 86803 HEPATITIS C AB TEST: CPT

## 2023-08-20 LAB — HCV AB SER QL: NORMAL

## 2023-08-21 ENCOUNTER — TELEPHONE (OUTPATIENT)
Dept: FAMILY MEDICINE CLINIC | Facility: CLINIC | Age: 43
End: 2023-08-21

## 2023-08-21 DIAGNOSIS — B35.1 FUNGAL INFECTION OF NAIL: Primary | ICD-10-CM

## 2023-08-21 DIAGNOSIS — R74.8 ELEVATED LIVER ENZYMES: ICD-10-CM

## 2023-08-21 RX ORDER — TERBINAFINE HYDROCHLORIDE 250 MG/1
250 TABLET ORAL DAILY
Qty: 30 TABLET | Refills: 0 | Status: SHIPPED | OUTPATIENT
Start: 2023-08-21 | End: 2023-09-20

## 2023-08-21 NOTE — TELEPHONE ENCOUNTER
The patient called requesting medication of Lamisil. He stated his labs came back normal.  Last visit was 8/17/23.

## 2023-08-21 NOTE — PROGRESS NOTES
Patient with fungal infection of the toenail liver function test came back normal we will start him on Lamisil and follow-up with repeat liver function test in 1 month

## 2023-09-06 DIAGNOSIS — Z12.11 ENCOUNTER FOR SCREENING COLONOSCOPY: Primary | ICD-10-CM

## 2023-09-11 ENCOUNTER — PREP FOR PROCEDURE (OUTPATIENT)
Age: 43
End: 2023-09-11

## 2023-09-11 ENCOUNTER — TELEPHONE (OUTPATIENT)
Age: 43
End: 2023-09-11

## 2023-09-11 DIAGNOSIS — Z12.11 SCREENING FOR COLON CANCER: Primary | ICD-10-CM

## 2023-09-11 NOTE — TELEPHONE ENCOUNTER
Scheduled date of colonoscopy (as of today):10/2/23    Physician performing colonoscopy:Dr Sky    Location of colonoscopy: [facility]    Bowel prep reviewed with patient:miralax/dulcolax    Instructions reviewed with patient by:prep instructions sent via [employer]    Clearances: N/A

## 2023-09-11 NOTE — TELEPHONE ENCOUNTER
ASC Screening    ASC Screening  BMI > than 45: No  Are you currently pregnant?: No  Do you rely on a wheelchair for mobility?: No  Do you need oxygen during the day?: No  Have you ever been informed by anesthesia that you have a difficult airway?: No  Have you been diagnosed with End Stage Renal Disease (ESRD)?: No  Are you actively on dialysis?: No  Have you been diagnosed with Pulmonary Hypertension?: No  Do you have a pacemaker or an Automatic Implantable Cardioverter Defibrillator (AICD)?: No  Have you ever had an organ transplant?: No  Have you had a stroke, heart attack, myocardial infarction (MI) within the last 6 months?: No  Have you ever been diagnosed with Aortic Stenosis?: No  Have you ever been diagnosed  with Congestive Heart Failure?: No  Have you ever been diagnosed with a heart valve disease?: No  Are you Diabetic?: Yes  If you are Diabetic, has your A1C been greater than 12 within the last six months?: No

## 2023-09-21 ENCOUNTER — TELEPHONE (OUTPATIENT)
Age: 43
End: 2023-09-21

## 2023-09-25 ENCOUNTER — TELEPHONE (OUTPATIENT)
Age: 43
End: 2023-09-25

## 2023-09-25 DIAGNOSIS — R74.8 ELEVATED LIVER ENZYMES: Primary | ICD-10-CM

## 2023-09-25 NOTE — TELEPHONE ENCOUNTER
Pt is requesting a refill of his Lamasil. It is not on his current med list. Can you look into this please. Thank you. Pt uses [facility]. Pt also thought he was supposed to do his bloodwork now but the scipt says 11/21. Can you confirm for pt? Please, call pt back to let him know.

## 2023-09-25 NOTE — TELEPHONE ENCOUNTER
Patient is returning Margarette's call. He declined to provide any further details.  Please try him again

## 2023-09-26 ENCOUNTER — TELEPHONE (OUTPATIENT)
Age: 43
End: 2023-09-26

## 2023-09-26 ENCOUNTER — TELEPHONE (OUTPATIENT)
Dept: FAMILY MEDICINE CLINIC | Facility: CLINIC | Age: 43
End: 2023-09-26

## 2023-09-26 DIAGNOSIS — B35.1 FUNGAL INFECTION OF TOENAIL: Primary | ICD-10-CM

## 2023-09-26 RX ORDER — TERBINAFINE HYDROCHLORIDE 250 MG/1
250 TABLET ORAL DAILY
Qty: 30 TABLET | Refills: 0 | Status: SHIPPED | OUTPATIENT
Start: 2023-09-26 | End: 2023-10-25 | Stop reason: SDUPTHER

## 2023-09-26 NOTE — TELEPHONE ENCOUNTER
Lamisil Prior Auth Approved. Please see scanned approval letter in media. Prior authorization approved   Case ID: [id number]      Payer:  [facility]    [phone]     [phone]    Your PA request has been approved. Britni Winslow information will be provided in the approval communication.  (Message [phone])   Approval Details    Authorized from September 26, 2023 to December 25, 2023

## 2023-10-22 ENCOUNTER — APPOINTMENT (OUTPATIENT)
Dept: LAB | Facility: CLINIC | Age: 43
End: 2023-10-22
Payer: COMMERCIAL

## 2023-10-22 DIAGNOSIS — R74.8 ELEVATED LIVER ENZYMES: ICD-10-CM

## 2023-10-22 LAB
ALBUMIN SERPL BCP-MCNC: 4.7 G/DL (ref 3.5–5)
ALP SERPL-CCNC: 78 U/L (ref 34–104)
ALT SERPL W P-5'-P-CCNC: 25 U/L (ref 7–52)
AST SERPL W P-5'-P-CCNC: 23 U/L (ref 13–39)
BILIRUB DIRECT SERPL-MCNC: 0.1 MG/DL (ref 0–0.2)
BILIRUB SERPL-MCNC: 0.55 MG/DL (ref 0.2–1)
PROT SERPL-MCNC: 7 G/DL (ref 6.4–8.4)

## 2023-10-22 PROCEDURE — 80076 HEPATIC FUNCTION PANEL: CPT

## 2023-10-22 PROCEDURE — 36415 COLL VENOUS BLD VENIPUNCTURE: CPT

## 2023-10-25 ENCOUNTER — OFFICE VISIT (OUTPATIENT)
Dept: FAMILY MEDICINE CLINIC | Facility: CLINIC | Age: 43
End: 2023-10-25
Payer: COMMERCIAL

## 2023-10-25 VITALS
DIASTOLIC BLOOD PRESSURE: 82 MMHG | OXYGEN SATURATION: 96 % | BODY MASS INDEX: 38.76 KG/M2 | HEART RATE: 107 BPM | WEIGHT: 302 LBS | HEIGHT: 74 IN | SYSTOLIC BLOOD PRESSURE: 116 MMHG

## 2023-10-25 DIAGNOSIS — B35.1 FUNGAL INFECTION OF NAIL: ICD-10-CM

## 2023-10-25 DIAGNOSIS — E11.9 TYPE 2 DIABETES MELLITUS WITHOUT COMPLICATION, WITHOUT LONG-TERM CURRENT USE OF INSULIN (HCC): ICD-10-CM

## 2023-10-25 DIAGNOSIS — E66.9 OBESITY (BMI 35.0-39.9 WITHOUT COMORBIDITY): Primary | ICD-10-CM

## 2023-10-25 DIAGNOSIS — M1A.09X0 CHRONIC GOUT OF MULTIPLE SITES, UNSPECIFIED CAUSE: ICD-10-CM

## 2023-10-25 DIAGNOSIS — R74.8 ELEVATED LIVER ENZYMES: ICD-10-CM

## 2023-10-25 DIAGNOSIS — M54.50 ACUTE LEFT-SIDED LOW BACK PAIN WITHOUT SCIATICA: ICD-10-CM

## 2023-10-25 DIAGNOSIS — B35.1 FUNGAL INFECTION OF TOENAIL: ICD-10-CM

## 2023-10-25 PROCEDURE — 99214 OFFICE O/P EST MOD 30 MIN: CPT | Performed by: INTERNAL MEDICINE

## 2023-10-25 RX ORDER — TERBINAFINE HYDROCHLORIDE 250 MG/1
250 TABLET ORAL DAILY
Qty: 30 TABLET | Refills: 0 | Status: SHIPPED | OUTPATIENT
Start: 2023-10-25 | End: 2023-11-24

## 2023-10-25 NOTE — ASSESSMENT & PLAN NOTE
Patient with hepatic steatosis however his repeat liver enzymes have come back normal patient is on terbinafine we will continue for 1 more month.

## 2023-10-25 NOTE — ASSESSMENT & PLAN NOTE
Patient continues to experience pain in the low back area radiating down the left lower extremity slightly better compared to before.   Patient was only concerned with the weight of 302 pounds which is adding to the problem strict weight reduction recommended if the symptoms persist follow-up with the orthopedics

## 2023-10-25 NOTE — ASSESSMENT & PLAN NOTE
Patient BMI went up again to 38.77 recommend very strongly to cut back on carbohydrate intake calorie intake lifestyle modification we will also start him on Wegovy once a week injection and see the response

## 2023-10-25 NOTE — ASSESSMENT & PLAN NOTE
Lab Results   Component Value Date    HGBA1C 6.1 (H) 08/19/2023   A1c came down to 6.1 patient is concerned more with the weight with prediabetes and weight will prescribe him Wegovy 0.25 and see the response to be given once a week we will follow-up in 3 to 4 weeks time.

## 2023-10-25 NOTE — PROGRESS NOTES
Name: Giuliana Pina      : 1980      MRN: [id number]  Encounter Provider: Jorge Payne MD  Encounter Date: 10/25/2023   Encounter department: [address] [place] [address] Road     1. Obesity (BMI 35.0-39.9 without comorbidity)  Assessment & Plan:  Patient BMI went up again to 38.77 recommend very strongly to cut back on carbohydrate intake calorie intake lifestyle modification we will also start him on Wegovy once a week injection and see the response    Orders:  -     Semaglutide-Weight Management (WEGOVY) 0.25 MG/0.5ML; Inject 0.5 mL (0.25 mg total) under the skin once a week for 28 days    2. Type 2 diabetes mellitus without complication, without long-term current use of insulin ([facility])  Assessment & Plan:    Lab Results   Component Value Date    HGBA1C 6.1 (H) 2023   A1c came down to 6.1 patient is concerned more with the weight with prediabetes and weight will prescribe him Wegovy 0.25 and see the response to be given once a week we will follow-up in 3 to 4 weeks time. Orders:  -     Semaglutide-Weight Management (WEGOVY) 0.25 MG/0.5ML; Inject 0.5 mL (0.25 mg total) under the skin once a week for 28 days    3. Fungal infection of toenail  -     terbinafine (LamISIL) 250 mg tablet; Take 1 tablet (250 mg total) by mouth daily    4. Chronic gout of multiple sites, unspecified cause  Assessment & Plan:  No episodes of gout in the recent past continue [place] 80 mg daily      5. Acute left-sided low back pain without sciatica  Assessment & Plan:  Patient continues to experience pain in the low back area radiating down the left lower extremity slightly better compared to before. Patient was only concerned with the weight of 302 pounds which is adding to the problem strict weight reduction recommended if the symptoms persist follow-up with the orthopedics      6.  Fungal infection of nail  Assessment & Plan:  Fungal infection of the toenail is getting better on terbinafine liver enzymes are normal.  We will give him 1 more month of terbinafine      7. Elevated liver enzymes  Assessment & Plan:  Patient with hepatic steatosis however his repeat liver enzymes have come back normal patient is on terbinafine we will continue for 1 more month. Subjective     Patient is coming here for a follow-up evaluation with a history of fungal infection of the toenail for which she is currently taking terbinafine and is responding well so far he has taken almost 2 months the liver enzymes came back normal we will extend it for 1 more month and see the response. Patient is also interested in weight loss at this point he is being on 302 pounds looking into various options with history of A1c coming up 7.9 then it came down to 6.1 by cutting back on his sugary drinks doing much better. The patient on Wegovy for the weight loss based on the response will make decisions and follow-up. Patient continues to experience pain in the low back area weight is a big factor for this pain      Review of Systems   Constitutional:  Negative for chills, fatigue and fever. HENT:  Negative for ear pain, sore throat and trouble swallowing. Eyes:  Negative for pain and visual disturbance. Respiratory:  Negative for cough and shortness of breath. Cardiovascular:  Negative for chest pain and palpitations. Gastrointestinal:  Negative for abdominal pain, constipation, diarrhea, nausea and vomiting. Genitourinary:  Negative for difficulty urinating, dysuria, flank pain, frequency, hematuria and testicular pain. Musculoskeletal:  Negative for arthralgias, back pain and myalgias. Skin:  Negative for color change, rash and wound. Neurological:  Negative for dizziness, seizures, syncope, light-headedness and headaches. Psychiatric/Behavioral:  Negative for agitation, confusion and hallucinations. All other systems reviewed and are negative.       Past Medical History:   Diagnosis Date Tachycardia      Past Surgical History:   Procedure Laterality Date    EYE SURGERY      HERNIA REPAIR       Family History   Problem Relation Age of Onset    Heart attack Father     Heart attack Family      Social History     Socioeconomic History    Marital status: Single     Spouse name: None    Number of children: None    Years of education: None    Highest education level: None   Occupational History    None   Tobacco Use    Smoking status: Former     Types: Cigarettes     Passive exposure: Never    Smokeless tobacco: Never   Vaping Use    Vaping Use: Never used   Substance and Sexual Activity    Alcohol use: Yes     Comment: occasional     Drug use: Never    Sexual activity: None   Other Topics Concern    None   Social History Narrative    None     Social Determinants of Health     Financial Resource Strain: Not on file   Food Insecurity: Not on file   Transportation Needs: Not on file   Physical Activity: Not on file   Stress: Not on file   Social Connections: Not on file   Intimate Partner Violence: Not on file   Housing Stability: Not on file     Current Outpatient Medications on File Prior to Visit   Medication Sig    febuxostat (ULORIC) 80 MG TABS Take 1 tablet (80 mg total) by mouth daily    Multiple Vitamin (ONE-A-DAY MENS PO) Take by mouth    [DISCONTINUED] terbinafine (LamISIL) 250 mg tablet Take 1 tablet (250 mg total) by mouth daily    [DISCONTINUED] methylPREDNISolone 4 MG tablet therapy pack Use as directed on package (Patient not taking: Reported on 10/25/2023)     Allergies   Allergen Reactions    Sulfamethoxazole-Trimethoprim      Immunization History   Administered Date(s) Administered    COVID-19 PFIZER VACCINE 0.3 ML IM 03/19/2021, 03/19/2021, 04/09/2021, 04/09/2021, 12/28/2021, 12/28/2021       Objective     /82   Pulse (!) 107   Ht 6' 2" (1.88 m)   Wt (!) 137 kg (302 lb)   SpO2 96%   BMI 38.77 kg/m²     Physical Exam  Vitals and nursing note reviewed.    Constitutional: Appearance: Normal appearance. He is obese. He is not ill-appearing. HENT:      Head: Normocephalic. Right Ear: External ear normal. There is no impacted cerumen. Left Ear: External ear normal. There is no impacted cerumen. Eyes:      General:         Right eye: No discharge. Left eye: No discharge. Conjunctiva/sclera: Conjunctivae normal.   Cardiovascular:      Rate and Rhythm: Normal rate and regular rhythm. Pulses:           Dorsalis pedis pulses are 2+ on the right side and 2+ on the left side. Posterior tibial pulses are 2+ on the right side and 2+ on the left side. Heart sounds: Normal heart sounds. Pulmonary:      Effort: Pulmonary effort is normal. No respiratory distress. Breath sounds: Normal breath sounds. No wheezing. Musculoskeletal:      Right lower leg: No edema. Left lower leg: No edema. Comments: SLR about 30 degrees both side   Feet:      Right foot:      Skin integrity: No ulcer, skin breakdown, erythema, warmth, callus or dry skin. Left foot:      Skin integrity: No ulcer, skin breakdown, erythema, warmth, callus or dry skin. Skin:     Coloration: Skin is not jaundiced or pale. Neurological:      General: No focal deficit present. Mental Status: He is alert and oriented to person, place, and time. Motor: No weakness. Gait: Gait normal.   Psychiatric:         Mood and Affect: Mood normal.         Behavior: Behavior normal.         Thought Content:  Thought content normal.         Judgment: Judgment normal.       Lab Results   Component Value Date    ALT 25 10/22/2023    AST 23 10/22/2023    ALKPHOS 78 10/22/2023        Alisha James MD

## 2023-10-25 NOTE — ASSESSMENT & PLAN NOTE
Fungal infection of the toenail is getting better on terbinafine liver enzymes are normal.  We will give him 1 more month of terbinafine

## 2023-10-27 ENCOUNTER — TELEPHONE (OUTPATIENT)
Age: 43
End: 2023-10-27

## 2023-10-27 DIAGNOSIS — E11.9 TYPE 2 DIABETES MELLITUS WITHOUT COMPLICATION, WITHOUT LONG-TERM CURRENT USE OF INSULIN (HCC): Primary | ICD-10-CM

## 2023-10-27 DIAGNOSIS — E66.9 OBESITY (BMI 35.0-39.9 WITHOUT COMORBIDITY): ICD-10-CM

## 2023-10-27 NOTE — TELEPHONE ENCOUNTER
Patient called in stating his wegovy was denied by his insurance and is wondering if another medication can be sent in to replace it. Patient would like a call back in-regards to this please.

## 2023-11-07 ENCOUNTER — OFFICE VISIT (OUTPATIENT)
Dept: FAMILY MEDICINE CLINIC | Facility: CLINIC | Age: 43
End: 2023-11-07
Payer: COMMERCIAL

## 2023-11-07 VITALS
DIASTOLIC BLOOD PRESSURE: 76 MMHG | HEIGHT: 74 IN | WEIGHT: 294 LBS | OXYGEN SATURATION: 98 % | BODY MASS INDEX: 37.73 KG/M2 | HEART RATE: 76 BPM | SYSTOLIC BLOOD PRESSURE: 120 MMHG

## 2023-11-07 DIAGNOSIS — M1A.09X0 CHRONIC GOUT OF MULTIPLE SITES, UNSPECIFIED CAUSE: ICD-10-CM

## 2023-11-07 DIAGNOSIS — B35.1 FUNGAL INFECTION OF NAIL: ICD-10-CM

## 2023-11-07 DIAGNOSIS — F41.9 ANXIETY: ICD-10-CM

## 2023-11-07 DIAGNOSIS — M54.50 ACUTE LEFT-SIDED LOW BACK PAIN WITHOUT SCIATICA: ICD-10-CM

## 2023-11-07 DIAGNOSIS — E66.9 OBESITY (BMI 35.0-39.9 WITHOUT COMORBIDITY): ICD-10-CM

## 2023-11-07 DIAGNOSIS — E11.9 TYPE 2 DIABETES MELLITUS WITHOUT COMPLICATION, WITHOUT LONG-TERM CURRENT USE OF INSULIN (HCC): Primary | ICD-10-CM

## 2023-11-07 PROCEDURE — 99214 OFFICE O/P EST MOD 30 MIN: CPT | Performed by: INTERNAL MEDICINE

## 2023-11-07 NOTE — ASSESSMENT & PLAN NOTE
Patient continues to experience some pain in the low back area especially the nighttime otherwise unremarkable symptomatic treatment

## 2023-11-07 NOTE — ASSESSMENT & PLAN NOTE
Lab Results   Component Value Date    HGBA1C 6.1 (H) 08/19/2023   Patient with hemoglobin A1c of 6.1 we will continue with Mounjaro will increase the dose to 5 mg follow-up in 1 month order the labs at that time.

## 2023-11-07 NOTE — ASSESSMENT & PLAN NOTE
Currently patient is taking Uloric 80 mg daily we will continue the same follow-up with repeat uric acid level at a later date.

## 2023-11-07 NOTE — PROGRESS NOTES
Office Visit Note  23     Donovan White 37 y.o. male MRN: [id number]  : 1980    Assessment:     1. Type 2 diabetes mellitus without complication, without long-term current use of insulin ([facility])  Assessment & Plan:    Lab Results   Component Value Date    HGBA1C 6.1 (H) 2023   Patient with hemoglobin A1c of 6.1 we will continue with Mounjaro will increase the dose to 5 mg follow-up in 1 month order the labs at that time. Orders:  -     tirzepatide 5 MG/0.5ML; Inject 0.5 mL (5 mg total) under the skin every 7 days    2. Acute left-sided low back pain without sciatica  Assessment & Plan:  Patient continues to experience some pain in the low back area especially the nighttime otherwise unremarkable symptomatic treatment      3. Anxiety    4. Chronic gout of multiple sites, unspecified cause  Assessment & Plan:  Currently patient is taking Uloric 80 mg daily we will continue the same follow-up with repeat uric acid level at a later date. 5. Fungal infection of nail  Assessment & Plan:  Patient is taking terbinafine for his fungal toenail infection last liver enzymes were normal.      6. Obesity (BMI 35.0-39.9 without comorbidity)  Assessment & Plan:  Patient has lost 8 pounds in the last couple of weeks time current BMI is 37.75 currently taking Mounjaro which also helping him to reduce the weight we will continue with the same adjust the dose    Orders:  -     tirzepatide 5 MG/0.5ML; Inject 0.5 mL (5 mg total) under the skin every 7 days               Discussion Summary and Plan: Today's care plan and medications were reviewed with patient in detail and all their questions answered to their satisfaction.     Chief Complaint   Patient presents with    Follow-up      Subjective:  Patient is coming here for a follow-up evaluation with regards to his symptoms of diabetes, history of low back pain and also obesity with recently started him on Mounjaro injection once a week to which she is responding very well he already lost 8 pounds of weight. Medication reviewed labs reviewed        The following portions of the patient's history were reviewed and updated as appropriate: allergies, current medications, past family history, past medical history, past social history, past surgical history and problem list.    Review of Systems   Constitutional:  Negative for chills and fever. HENT:  Negative for ear pain and sore throat. Eyes:  Negative for pain and visual disturbance. Respiratory:  Negative for cough and shortness of breath. Cardiovascular:  Negative for chest pain and palpitations. Gastrointestinal:  Negative for abdominal pain and vomiting. Genitourinary:  Negative for dysuria and hematuria. Musculoskeletal:  Negative for arthralgias and back pain. Skin:  Negative for color change and rash. Neurological:  Negative for seizures and syncope. All other systems reviewed and are negative.         Historical Information   Patient Active Problem List   Diagnosis    Atypical chest pain    Anxiety    Tachycardia    Chronic gout of multiple sites    Acute low back pain    Renal insufficiency    Fungal infection of nail    Obesity (BMI 35.0-39.9 without comorbidity)    Type 2 diabetes mellitus without complication, without long-term current use of insulin (HCC)    Elevated liver enzymes     Past Medical History:   Diagnosis Date    Tachycardia      Past Surgical History:   Procedure Laterality Date    EYE SURGERY      HERNIA REPAIR       Social History     Substance and Sexual Activity   Alcohol Use Yes    Comment: occasional      Social History     Substance and Sexual Activity   Drug Use Never     Social History     Tobacco Use   Smoking Status Former    Types: Cigarettes    Passive exposure: Never   Smokeless Tobacco Never     Family History   Problem Relation Age of Onset    Heart attack Father     Heart attack Family      Health Maintenance Due   Topic    PT PLAN OF CARE Pneumococcal Vaccine: Pediatrics (0 to 5 Years) and At-Risk Patients (6 to 59 Years) (1 - PCV)    DM Eye Exam     HIV Screening     Annual Physical     DTaP,Tdap,and Td Vaccines (1 - Tdap)    COVID-19 Vaccine (6 - Pfizer series)    Colorectal Cancer Screening     Influenza Vaccine (1)    Depression Screening     BMI: Followup Plan       Meds/Allergies       Current Outpatient Medications:     febuxostat (ULORIC) 80 MG TABS, Take 1 tablet (80 mg total) by mouth daily, Disp: 90 tablet, Rfl: 0    Multiple Vitamin (ONE-A-DAY MENS PO), Take by mouth, Disp: , Rfl:     terbinafine (LamISIL) 250 mg tablet, Take 1 tablet (250 mg total) by mouth daily, Disp: 30 tablet, Rfl: 0    tirzepatide 2.5 MG/0.5ML, Inject 0.5 mL (2.5 mg total) under the skin every 7 days, Disp: 2 mL, Rfl: 0    tirzepatide 5 MG/0.5ML, Inject 0.5 mL (5 mg total) under the skin every 7 days, Disp: 2 mL, Rfl: 0      Objective:    Vitals:   /76 (BP Location: Right arm, Patient Position: Sitting, Cuff Size: Large)   Pulse 76   Ht 6' 2" (1.88 m)   Wt 133 kg (294 lb)   SpO2 98%   BMI 37.75 kg/m²   Body mass index is 37.75 kg/m². Vitals:    11/07/23 1517   Weight: 133 kg (294 lb)       Physical Exam  Vitals and nursing note reviewed. Constitutional:       Appearance: Normal appearance. Cardiovascular:      Rate and Rhythm: Normal rate and regular rhythm. Heart sounds: Normal heart sounds. Pulmonary:      Effort: Pulmonary effort is normal.      Breath sounds: Normal breath sounds. Abdominal:      Palpations: Abdomen is soft. Musculoskeletal:      Right lower leg: No edema. Left lower leg: No edema. Skin:     General: Skin is warm and dry. Neurological:      Mental Status: He is alert and oriented to person, place, and time.    Psychiatric:         Mood and Affect: Mood normal.         Behavior: Behavior normal.         Lab Review   Appointment on 10/22/2023   Component Date Value Ref Range Status    Total Bilirubin 10/22/2023 0. 55  0.20 - 1.00 mg/dL Final    Use of this assay is not recommended for patients undergoing treatment with eltrombopag due to the potential for falsely elevated results. N-acetyl-p-benzoquinone imine (metabolite of Acetaminophen) will generate erroneously low results in samples for patients that have taken an overdose of Acetaminophen. Bilirubin, Direct 10/22/2023 0.10  0.00 - 0.20 mg/dL Final    Alkaline Phosphatase 10/22/2023 78  34 - 104 U/L Final    AST 10/22/2023 23  13 - 39 U/L Final    ALT 10/22/2023 25  7 - 52 U/L Final    Specimen collection should occur prior to Sulfasalazine administration due to the potential for falsely depressed results. Total Protein 10/22/2023 7.0  6.4 - 8.4 g/dL Final    Albumin 10/22/2023 4.7  3.5 - 5.0 g/dL Final         Greg Ferguson MD        "This note has been constructed using a voice recognition system. Therefore there may be syntax, spelling, and/or grammatical errors.  Please call if you have any questions. "

## 2023-11-07 NOTE — ASSESSMENT & PLAN NOTE
Patient has lost 8 pounds in the last couple of weeks time current BMI is 37.75 currently taking Mounjaro which also helping him to reduce the weight we will continue with the same adjust the dose

## 2023-11-24 DIAGNOSIS — B35.1 FUNGAL INFECTION OF TOENAIL: ICD-10-CM

## 2023-11-26 RX ORDER — TERBINAFINE HYDROCHLORIDE 250 MG/1
250 TABLET ORAL DAILY
Qty: 30 TABLET | Refills: 0 | Status: SHIPPED | OUTPATIENT
Start: 2023-11-26 | End: 2023-12-26

## 2023-12-01 ENCOUNTER — TELEPHONE (OUTPATIENT)
Age: 43
End: 2023-12-01

## 2023-12-01 ENCOUNTER — TELEPHONE (OUTPATIENT)
Dept: FAMILY MEDICINE CLINIC | Facility: CLINIC | Age: 43
End: 2023-12-01

## 2023-12-01 NOTE — TELEPHONE ENCOUNTER
Remigio Fonseca called in, in regards to his upcoming appointment. He states he was to have a full panel of blood work done, but there are no orders in his lab. Remigio Fonseca states that if he doesn't have the blood work done prior to this appointment, he will need to reschedule. He specifically asked for either Mikayla or Mino Mathews in the office to return his phone call in regards to this message. Sending high priority, due to being time sensitive.      Please advise, thank you

## 2023-12-03 DIAGNOSIS — E11.9 TYPE 2 DIABETES MELLITUS WITHOUT COMPLICATION, WITHOUT LONG-TERM CURRENT USE OF INSULIN (HCC): Primary | ICD-10-CM

## 2023-12-09 ENCOUNTER — APPOINTMENT (OUTPATIENT)
Dept: LAB | Facility: CLINIC | Age: 43
End: 2023-12-09
Payer: COMMERCIAL

## 2023-12-09 DIAGNOSIS — N18.31 STAGE 3A CHRONIC KIDNEY DISEASE (HCC): Primary | ICD-10-CM

## 2023-12-09 DIAGNOSIS — B35.1 FUNGAL INFECTION OF NAIL: ICD-10-CM

## 2023-12-09 DIAGNOSIS — E11.9 TYPE 2 DIABETES MELLITUS WITHOUT COMPLICATION, WITHOUT LONG-TERM CURRENT USE OF INSULIN (HCC): ICD-10-CM

## 2023-12-09 DIAGNOSIS — R74.8 ELEVATED LIVER ENZYMES: ICD-10-CM

## 2023-12-09 LAB
ALBUMIN SERPL BCP-MCNC: 4.6 G/DL (ref 3.5–5)
ALP SERPL-CCNC: 79 U/L (ref 34–104)
ALT SERPL W P-5'-P-CCNC: 32 U/L (ref 7–52)
ANION GAP SERPL CALCULATED.3IONS-SCNC: 8 MMOL/L
AST SERPL W P-5'-P-CCNC: 27 U/L (ref 13–39)
BILIRUB DIRECT SERPL-MCNC: 0.03 MG/DL (ref 0–0.2)
BILIRUB SERPL-MCNC: 0.5 MG/DL (ref 0.2–1)
BUN SERPL-MCNC: 24 MG/DL (ref 5–25)
CALCIUM SERPL-MCNC: 9.8 MG/DL (ref 8.4–10.2)
CHLORIDE SERPL-SCNC: 105 MMOL/L (ref 96–108)
CO2 SERPL-SCNC: 28 MMOL/L (ref 21–32)
CREAT SERPL-MCNC: 1.5 MG/DL (ref 0.6–1.3)
EST. AVERAGE GLUCOSE BLD GHB EST-MCNC: 131 MG/DL
GFR SERPL CREATININE-BSD FRML MDRD: 55 ML/MIN/1.73SQ M
GLUCOSE P FAST SERPL-MCNC: 119 MG/DL (ref 65–99)
HBA1C MFR BLD: 6.2 %
POTASSIUM SERPL-SCNC: 4.4 MMOL/L (ref 3.5–5.3)
PROT SERPL-MCNC: 7.1 G/DL (ref 6.4–8.4)
SODIUM SERPL-SCNC: 141 MMOL/L (ref 135–147)

## 2023-12-09 PROCEDURE — 80053 COMPREHEN METABOLIC PANEL: CPT

## 2023-12-09 PROCEDURE — 82248 BILIRUBIN DIRECT: CPT

## 2023-12-09 PROCEDURE — 83036 HEMOGLOBIN GLYCOSYLATED A1C: CPT

## 2023-12-09 PROCEDURE — 36415 COLL VENOUS BLD VENIPUNCTURE: CPT

## 2023-12-11 DIAGNOSIS — N18.31 STAGE 3A CHRONIC KIDNEY DISEASE (HCC): ICD-10-CM

## 2023-12-11 DIAGNOSIS — E11.9 TYPE 2 DIABETES MELLITUS WITHOUT COMPLICATION, WITHOUT LONG-TERM CURRENT USE OF INSULIN (HCC): Primary | ICD-10-CM

## 2023-12-11 DIAGNOSIS — E66.9 OBESITY (BMI 35.0-39.9 WITHOUT COMORBIDITY): ICD-10-CM

## 2023-12-11 DIAGNOSIS — E11.9 TYPE 2 DIABETES MELLITUS WITHOUT COMPLICATION, WITHOUT LONG-TERM CURRENT USE OF INSULIN (HCC): ICD-10-CM

## 2023-12-11 NOTE — TELEPHONE ENCOUNTER
Reason for call:   [x] Refill   [] Prior Auth  [] Other:     Office:   [x] PCP/Provider - Dr Codey Beasley  [] Specialty/Provider -     Medication: [facility]- tirzepatide 0.5ml weekly     Pharmacy: [facility] [place]    Does the patient have enough for 3 days?    [] Yes   [x] No - Send as HP to POD

## 2023-12-11 NOTE — TELEPHONE ENCOUNTER
Pt called in regards to medication either wegovy or mounjaro. Pt is not sure on which medication he is currently taking. Pt was at work and stated that he would call back to clarify which medication that he is currently taking once he has arrived home.  Neither of those medications is on his med list.

## 2023-12-15 ENCOUNTER — TELEPHONE (OUTPATIENT)
Age: 43
End: 2023-12-15

## 2023-12-15 NOTE — TELEPHONE ENCOUNTER
Pt was calling to see if Dr. Tri Hanson had any rec for a psychiatrist or would be willing to give him a referral- offered to book and appt but first available is end of January and he would like to talk to someone soon. Please advise.

## 2023-12-28 ENCOUNTER — TELEPHONE (OUTPATIENT)
Age: 43
End: 2023-12-28

## 2023-12-28 DIAGNOSIS — B35.1 FUNGAL INFECTION OF TOENAIL: ICD-10-CM

## 2023-12-28 RX ORDER — TERBINAFINE HYDROCHLORIDE 250 MG/1
250 TABLET ORAL DAILY
Qty: 30 TABLET | Refills: 0 | Status: SHIPPED | OUTPATIENT
Start: 2023-12-28 | End: 2024-01-27

## 2023-12-28 NOTE — TELEPHONE ENCOUNTER
Erik Mora    Patient is asking for the below medication, it isn't able to be re-ordered.    terbinafine (LamISIL) 250 mg tablet     Pharmacy:  [facility]  [place]  [place], [place]

## 2023-12-29 ENCOUNTER — TELEPHONE (OUTPATIENT)
Age: 43
End: 2023-12-29

## 2023-12-29 NOTE — TELEPHONE ENCOUNTER
[place] for Terbinafine (LamISIL) 250 mg tablet Approved     Date(s) Approved 12- to 3-    Case #[id number]     Patient advised by [x] [employer] Message                      [] Phone call       Pharmacy advised by [x]Fax                                     []Phone call    Approval letter scanned into Media Yes

## 2023-12-29 NOTE — TELEPHONE ENCOUNTER
PA for Terbinafine (LamISIL) 250 mg tablet    Submitted via  []CMM-KEY   [x][facility]-Case ID #[id number]  []Faxed to plan   []Other website     Office notes sent, clinical questions answered. Awaiting determination

## 2024-01-05 ENCOUNTER — OFFICE VISIT (OUTPATIENT)
Dept: FAMILY MEDICINE CLINIC | Facility: CLINIC | Age: 44
End: 2024-01-05
Payer: COMMERCIAL

## 2024-01-05 ENCOUNTER — TELEPHONE (OUTPATIENT)
Age: 44
End: 2024-01-05

## 2024-01-05 VITALS
DIASTOLIC BLOOD PRESSURE: 76 MMHG | WEIGHT: 278 LBS | HEIGHT: 74 IN | SYSTOLIC BLOOD PRESSURE: 120 MMHG | OXYGEN SATURATION: 100 % | BODY MASS INDEX: 35.68 KG/M2 | HEART RATE: 90 BPM

## 2024-01-05 DIAGNOSIS — M1A.09X0 CHRONIC GOUT OF MULTIPLE SITES, UNSPECIFIED CAUSE: ICD-10-CM

## 2024-01-05 DIAGNOSIS — R74.8 ELEVATED LIVER ENZYMES: ICD-10-CM

## 2024-01-05 DIAGNOSIS — E11.9 TYPE 2 DIABETES MELLITUS WITHOUT COMPLICATION, WITHOUT LONG-TERM CURRENT USE OF INSULIN (HCC): ICD-10-CM

## 2024-01-05 DIAGNOSIS — B35.1 FUNGAL INFECTION OF NAIL: ICD-10-CM

## 2024-01-05 DIAGNOSIS — N28.9 RENAL INSUFFICIENCY: Primary | ICD-10-CM

## 2024-01-05 DIAGNOSIS — F41.9 ANXIETY: ICD-10-CM

## 2024-01-05 DIAGNOSIS — E66.9 OBESITY (BMI 35.0-39.9 WITHOUT COMORBIDITY): ICD-10-CM

## 2024-01-05 PROCEDURE — 99214 OFFICE O/P EST MOD 30 MIN: CPT | Performed by: INTERNAL MEDICINE

## 2024-01-05 NOTE — ASSESSMENT & PLAN NOTE
Patient did not have any episodes of gout currently is taking Uloric 80 mg half a tablet daily appears to be controlling it very well.  Will follow-up with a uric acid level at a later date.

## 2024-01-05 NOTE — TELEPHONE ENCOUNTER
Patient called regarding the referral for Psych.Patient would like to know which doctor he should contact to schedule an appt.Please advise.

## 2024-01-05 NOTE — PROGRESS NOTES
Office Visit Note  24     Erik White 43 y.o. male MRN: [id number]  : 1980    Assessment:     1. Renal insufficiency  Assessment & Plan:  Creatinine level went up to 1.50 recommend very strongly patient to drink more fluids we will follow-up with repeat creatinine level.  Patient is currently not taking any over-the-counter medications.    Orders:  -     Comprehensive metabolic panel; Future    2. Anxiety  Assessment & Plan:  Patient with anxiety symptoms gets upset quickly we will arrange for patient be seen by the psychologist and follow-up.  Patient at present time does not want to be on any medication.      3. Chronic gout of multiple sites, unspecified cause  Assessment & Plan:  Patient did not have any episodes of gout currently is taking Uloric 80 mg half a tablet daily appears to be controlling it very well.  Will follow-up with a uric acid level at a later date.      4. Fungal infection of nail  Assessment & Plan:  Looking much better fungal toenail infection on terbinafine      5. Elevated liver enzymes  Assessment & Plan:  Liver enzymes are normal currently taking Lamisil we will continue this month also       6. Obesity (BMI 35.0-39.9 without comorbidity)  Assessment & Plan:  Patient with a BMI of 35.69 doing much better with the medication Mounjaro lost 23 pounds in last 2 months will update with the dosage on this medication and follow-up his hemoglobin A1c is 6.2      7. Type 2 diabetes mellitus without complication, without long-term current use of insulin ([facility])  Assessment & Plan:    Lab Results   Component Value Date    HGBA1C 6.2 (H) 2023   A1c is at 6.2 we will adjust the doses of the Mounjaro and follow-up it appears to be helping controlling the A1c and also help him lose the weight.    Orders:  -     tirzepatide (Mounjaro) 7.5 MG/0.5ML; Inject 0.5 mL (7.5 mg total) under the skin every 7 days  -     Comprehensive metabolic panel; Future               Discussion Summary  and Plan:  Today's care plan and medications were reviewed with patient in detail and all their questions answered to their satisfaction.    Chief Complaint   Patient presents with    Follow-up      Subjective:  Patient is coming here for a follow-up evaluation with regards to prediabetes, obesity, fungal infection of the toenail.  Currently patient is taking Mounjaro and also he is on Lamisil.  Toenail appears to be getting better patient has lost significant weight about 23 pounds in the last 2 months time patient is also following strict diet with eating less portions.    Patient also complains of having symptoms of getting upset very quickly anxiety.  Medications reviewed labs reviewed creatinine went up to 1.50.        The following portions of the patient's history were reviewed and updated as appropriate: allergies, current medications, past family history, past medical history, past social history, past surgical history and problem list.    Review of Systems   Constitutional:  Negative for chills and fever.   HENT:  Negative for ear pain and sore throat.    Eyes:  Negative for pain and visual disturbance.   Respiratory:  Negative for cough and shortness of breath.    Cardiovascular:  Negative for chest pain and palpitations.   Gastrointestinal:  Negative for abdominal pain and vomiting.   Genitourinary:  Negative for dysuria and hematuria.   Musculoskeletal:  Negative for arthralgias and back pain.   Skin:  Negative for color change and rash.   Neurological:  Negative for seizures and syncope.   Psychiatric/Behavioral:  The patient is nervous/anxious.    All other systems reviewed and are negative.        Historical Information   Patient Active Problem List   Diagnosis    Atypical chest pain    Anxiety    Tachycardia    Chronic gout of multiple sites    Acute low back pain    Renal insufficiency    Fungal infection of nail    Obesity (BMI 35.0-39.9 without comorbidity)    Type 2 diabetes mellitus without  "complication, without long-term current use of insulin (HCC)    Elevated liver enzymes     Past Medical History:   Diagnosis Date    Tachycardia      Past Surgical History:   Procedure Laterality Date    EYE SURGERY      HERNIA REPAIR       Social History     Substance and Sexual Activity   Alcohol Use Yes    Comment: occasional      Social History     Substance and Sexual Activity   Drug Use Never     Social History     Tobacco Use   Smoking Status Former    Types: Cigarettes    Passive exposure: Never   Smokeless Tobacco Never     Family History   Problem Relation Age of Onset    Heart attack Father     Heart attack Family      Health Maintenance Due   Topic    PT PLAN OF CARE     Pneumococcal Vaccine: Pediatrics (0 to 5 Years) and At-Risk Patients (6 to 64 Years) (1 - PCV)    DM Eye Exam     HIV Screening     Annual Physical     DTaP,Tdap,and Td Vaccines (1 - Tdap)    Colorectal Cancer Screening     Influenza Vaccine (1)    COVID-19 Vaccine (7 - 2023-24 season)    Depression Screening     Diabetic Foot Exam       Meds/Allergies       Current Outpatient Medications:     febuxostat (ULORIC) 80 MG TABS, Take 1 tablet (80 mg total) by mouth daily, Disp: 90 tablet, Rfl: 0    Multiple Vitamin (ONE-A-DAY MENS PO), Take by mouth, Disp: , Rfl:     terbinafine (LamISIL) 250 mg tablet, Take 1 tablet (250 mg total) by mouth daily, Disp: 30 tablet, Rfl: 0    tirzepatide (Mounjaro) 7.5 MG/0.5ML, Inject 0.5 mL (7.5 mg total) under the skin every 7 days, Disp: 2 mL, Rfl: 0      Objective:    Vitals:   /76 (BP Location: Right arm, Patient Position: Sitting, Cuff Size: Large)   Pulse 90   Ht 6' 2\" (1.88 m)   Wt 126 kg (278 lb)   SpO2 100%   BMI 35.69 kg/m²   Body mass index is 35.69 kg/m².  Vitals:    01/05/24 0734   Weight: 126 kg (278 lb)       Physical Exam  Vitals and nursing note reviewed.   Constitutional:       Appearance: Normal appearance.   Cardiovascular:      Rate and Rhythm: Normal rate and regular rhythm. "      Heart sounds: Normal heart sounds.   Pulmonary:      Effort: Pulmonary effort is normal.      Breath sounds: Normal breath sounds.   Abdominal:      Palpations: Abdomen is soft.   Musculoskeletal:         General: Normal range of motion.      Right lower leg: No edema.      Left lower leg: No edema.   Skin:     General: Skin is warm and dry.   Neurological:      Mental Status: He is alert and oriented to person, place, and time.   Psychiatric:         Mood and Affect: Mood normal.         Behavior: Behavior normal.         Lab Review   Appointment on 12/09/2023   Component Date Value Ref Range Status    Sodium 12/09/2023 141  135 - 147 mmol/L Final    Potassium 12/09/2023 4.4  3.5 - 5.3 mmol/L Final    Chloride 12/09/2023 105  96 - 108 mmol/L Final    CO2 12/09/2023 28  21 - 32 mmol/L Final    ANION GAP 12/09/2023 8  mmol/L Final    BUN 12/09/2023 24  5 - 25 mg/dL Final    Creatinine 12/09/2023 1.50 (H)  0.60 - 1.30 mg/dL Final    Standardized to IDMS reference method    Glucose, Fasting 12/09/2023 119 (H)  65 - 99 mg/dL Final    Calcium 12/09/2023 9.8  8.4 - 10.2 mg/dL Final    AST 12/09/2023 27  13 - 39 U/L Final    ALT 12/09/2023 32  7 - 52 U/L Final    Specimen collection should occur prior to Sulfasalazine administration due to the potential for falsely depressed results.     Alkaline Phosphatase 12/09/2023 79  34 - 104 U/L Final    Total Protein 12/09/2023 7.1  6.4 - 8.4 g/dL Final    Albumin 12/09/2023 4.6  3.5 - 5.0 g/dL Final    Total Bilirubin 12/09/2023 0.50  0.20 - 1.00 mg/dL Final    Use of this assay is not recommended for patients undergoing treatment with eltrombopag due to the potential for falsely elevated results.  N-acetyl-p-benzoquinone imine (metabolite of Acetaminophen) will generate erroneously low results in samples for patients that have taken an overdose of Acetaminophen.    eGFR 12/09/2023 55  ml/min/1.73sq m Final    Hemoglobin A1C 12/09/2023 6.2 (H)  Normal 4.0-5.6%; PreDiabetic  "5.7-6.4%; Diabetic >=6.5%; Glycemic control for adults with diabetes <7.0% % Final    EAG 12/09/2023 131  mg/dl Final    Bilirubin, Direct 12/09/2023 0.03  0.00 - 0.20 mg/dL Final         Sylvester Mora MD        \"This note has been constructed using a voice recognition system.Therefore there may be syntax, spelling, and/or grammatical errors. Please call if you have any questions. \"  "

## 2024-01-05 NOTE — ASSESSMENT & PLAN NOTE
Lab Results   Component Value Date    HGBA1C 6.2 (H) 12/09/2023   A1c is at 6.2 we will adjust the doses of the Mounjaro and follow-up it appears to be helping controlling the A1c and also help him lose the weight.  Increase the dose of Mounjaro to 7.5 mg

## 2024-01-05 NOTE — ASSESSMENT & PLAN NOTE
Patient with a BMI of 35.69 doing much better with the medication Mounjaro lost 23 pounds in last 2 months will update with the dosage on this medication and follow-up his hemoglobin A1c is 6.2.  Currently patient is taking Mounjaro 5 mg we will increase it to 7.5 weekly

## 2024-01-05 NOTE — ASSESSMENT & PLAN NOTE
Creatinine level went up to 1.50 recommend very strongly patient to drink more fluids we will follow-up with repeat creatinine level.  Patient is currently not taking any over-the-counter medications.

## 2024-01-05 NOTE — ASSESSMENT & PLAN NOTE
Patient with anxiety symptoms gets upset quickly we will arrange for patient be seen by the psychologist and follow-up.  Patient at present time does not want to be on any medication.

## 2024-02-06 ENCOUNTER — TELEPHONE (OUTPATIENT)
Age: 44
End: 2024-02-06

## 2024-02-06 NOTE — TELEPHONE ENCOUNTER
Patient called the RX Refill Line. Message is being forwarded to the office.     Patient is requesting a refill on:  terbinafine (LamISIL) 250 mg tablet   This medication is not active in his chart. Please review and advise, as the patient took his last pill today.      Please contact patient at  with any issues.

## 2024-02-10 ENCOUNTER — APPOINTMENT (OUTPATIENT)
Dept: LAB | Facility: CLINIC | Age: 44
End: 2024-02-10
Payer: COMMERCIAL

## 2024-02-10 DIAGNOSIS — N18.31 STAGE 3A CHRONIC KIDNEY DISEASE (HCC): ICD-10-CM

## 2024-02-10 DIAGNOSIS — E11.9 TYPE 2 DIABETES MELLITUS WITHOUT COMPLICATION, WITHOUT LONG-TERM CURRENT USE OF INSULIN (HCC): ICD-10-CM

## 2024-02-10 DIAGNOSIS — N28.9 RENAL INSUFFICIENCY: ICD-10-CM

## 2024-02-10 LAB
ALBUMIN SERPL BCP-MCNC: 4.8 G/DL (ref 3.5–5)
ALP SERPL-CCNC: 71 U/L (ref 34–104)
ALT SERPL W P-5'-P-CCNC: 29 U/L (ref 7–52)
ANION GAP SERPL CALCULATED.3IONS-SCNC: 6 MMOL/L
AST SERPL W P-5'-P-CCNC: 23 U/L (ref 13–39)
BILIRUB SERPL-MCNC: 0.67 MG/DL (ref 0.2–1)
BUN SERPL-MCNC: 21 MG/DL (ref 5–25)
CALCIUM SERPL-MCNC: 9.6 MG/DL (ref 8.4–10.2)
CHLORIDE SERPL-SCNC: 106 MMOL/L (ref 96–108)
CO2 SERPL-SCNC: 27 MMOL/L (ref 21–32)
CREAT SERPL-MCNC: 1.36 MG/DL (ref 0.6–1.3)
GFR SERPL CREATININE-BSD FRML MDRD: 62 ML/MIN/1.73SQ M
GLUCOSE P FAST SERPL-MCNC: 110 MG/DL (ref 65–99)
POTASSIUM SERPL-SCNC: 4 MMOL/L (ref 3.5–5.3)
PROT SERPL-MCNC: 7.1 G/DL (ref 6.4–8.4)
SODIUM SERPL-SCNC: 139 MMOL/L (ref 135–147)

## 2024-02-10 PROCEDURE — 80053 COMPREHEN METABOLIC PANEL: CPT

## 2024-02-10 PROCEDURE — 36415 COLL VENOUS BLD VENIPUNCTURE: CPT

## 2024-02-12 ENCOUNTER — TELEPHONE (OUTPATIENT)
Age: 44
End: 2024-02-12

## 2024-02-12 ENCOUNTER — OFFICE VISIT (OUTPATIENT)
Dept: FAMILY MEDICINE CLINIC | Facility: CLINIC | Age: 44
End: 2024-02-12
Payer: COMMERCIAL

## 2024-02-12 VITALS
WEIGHT: 275 LBS | SYSTOLIC BLOOD PRESSURE: 118 MMHG | HEIGHT: 74 IN | HEART RATE: 98 BPM | BODY MASS INDEX: 35.29 KG/M2 | DIASTOLIC BLOOD PRESSURE: 80 MMHG | OXYGEN SATURATION: 93 %

## 2024-02-12 DIAGNOSIS — N28.9 RENAL INSUFFICIENCY: ICD-10-CM

## 2024-02-12 DIAGNOSIS — B35.1 FUNGAL INFECTION OF NAIL: ICD-10-CM

## 2024-02-12 DIAGNOSIS — M1A.09X0 CHRONIC GOUT OF MULTIPLE SITES, UNSPECIFIED CAUSE: ICD-10-CM

## 2024-02-12 DIAGNOSIS — E66.9 OBESITY (BMI 35.0-39.9 WITHOUT COMORBIDITY): ICD-10-CM

## 2024-02-12 DIAGNOSIS — N52.9 ERECTILE DYSFUNCTION, UNSPECIFIED ERECTILE DYSFUNCTION TYPE: ICD-10-CM

## 2024-02-12 DIAGNOSIS — E11.9 TYPE 2 DIABETES MELLITUS WITHOUT COMPLICATION, WITHOUT LONG-TERM CURRENT USE OF INSULIN (HCC): Primary | ICD-10-CM

## 2024-02-12 DIAGNOSIS — R00.0 TACHYCARDIA: ICD-10-CM

## 2024-02-12 DIAGNOSIS — F41.9 ANXIETY: ICD-10-CM

## 2024-02-12 PROCEDURE — 99214 OFFICE O/P EST MOD 30 MIN: CPT | Performed by: INTERNAL MEDICINE

## 2024-02-12 RX ORDER — TERBINAFINE HYDROCHLORIDE 250 MG/1
250 TABLET ORAL DAILY
Status: CANCELLED | OUTPATIENT
Start: 2024-02-12

## 2024-02-12 RX ORDER — TERBINAFINE HYDROCHLORIDE 250 MG/1
250 TABLET ORAL DAILY
COMMUNITY
End: 2024-02-12 | Stop reason: ALTCHOICE

## 2024-02-12 RX ORDER — SILDENAFIL 100 MG/1
100 TABLET, FILM COATED ORAL DAILY PRN
Qty: 10 TABLET | Refills: 0 | Status: SHIPPED | OUTPATIENT
Start: 2024-02-12

## 2024-02-12 NOTE — ASSESSMENT & PLAN NOTE
Patient with anxiety symptoms anger symptoms getting better compared to before we referred him to psychology which unfortunately is not able to get an appointment but the symptoms are improving.  Patient can still be seen by the psychologist in the network in his insurance company.

## 2024-02-12 NOTE — ASSESSMENT & PLAN NOTE
Patient clinical has some mild fungal infection of the toenails recommend Penlac solution to be applied

## 2024-02-12 NOTE — TELEPHONE ENCOUNTER
Patient called the RX Refill Line. Message is being forwarded to the office.     Patient is requesting a call back from either Maria E or Margarette in regards to a medication. He stated he needed to talk to one of them about it. Please review and advise    Please contact patient at

## 2024-02-12 NOTE — ASSESSMENT & PLAN NOTE
Patient last hemoglobin A1c 6.2 will follow with a repeat 1 in couple of months time  Lab Results   Component Value Date    HGBA1C 6.2 (H) 12/09/2023   Patient's last hemoglobin A1c eight 6.2 we will follow-up with repeat 1 couple of months time.

## 2024-02-12 NOTE — ASSESSMENT & PLAN NOTE
As mentioned in HPI he lost about 43 pounds in last 3 and half months we will continue Mounjaro for now

## 2024-02-12 NOTE — PROGRESS NOTES
Office Visit Note  24     Erik White 43 y.o. male MRN: [id number]  : 1980    Assessment:     1. Type 2 diabetes mellitus without complication, without long-term current use of insulin ([facility])  Assessment & Plan:  Patient last hemoglobin A1c 6.2 will follow with a repeat 1 in couple of months time  Lab Results   Component Value Date    HGBA1C 6.2 (H) 2023   Patient's last hemoglobin A1c eight 6.2 we will follow-up with repeat 1 couple of months time.    Orders:  -     tirzepatide (Mounjaro) 10 MG/0.5ML; Inject 0.5 mL (10 mg total) under the skin every 7 days    2. Obesity (BMI 35.0-39.9 without comorbidity)  Assessment & Plan:  As mentioned in HPI he lost about 43 pounds in last 3 and half months we will continue Mounjaro for now    Orders:  -     tirzepatide (Mounjaro) 10 MG/0.5ML; Inject 0.5 mL (10 mg total) under the skin every 7 days    3. Fungal infection of nail  Assessment & Plan:  Patient clinical has some mild fungal infection of the toenails recommend Penlac solution to be applied    Orders:  -     ciclopirox (PENLAC) 8 % solution; Apply topically daily at bedtime    4. Renal insufficiency  Assessment & Plan:  Recommend patient drink more fluids creatinine came down to 1.36      5. Anxiety  Assessment & Plan:  Patient with anxiety symptoms anger symptoms getting better compared to before we referred him to psychology which unfortunately is not able to get an appointment but the symptoms are improving.  Patient can still be seen by the psychologist in the network in his insurance company.      6. Chronic gout of multiple sites, unspecified cause  Assessment & Plan:  Patient is on Uloric 80 mg.  Continue the same half a tablet daily      7. Tachycardia    8. Erectile dysfunction, unspecified erectile dysfunction type  -     sildenafil (VIAGRA) 100 mg tablet; Take 1 tablet (100 mg total) by mouth daily as needed for erectile dysfunction          Depression Screening and Follow-up Plan:  Patient was screened for depression during today's encounter. They screened negative with a PHQ-2 score of 0.          Discussion Summary and Plan:  Today's care plan and medications were reviewed with patient in detail and all their questions answered to their satisfaction.    Chief Complaint   Patient presents with    Follow-up      Subjective:  Patient is coming here for a follow-up evaluation with regards to symptoms of obesity for which she has been started on Mounjaro and has responded very well in the last 3-1/2 months he has lost about 43 pounds.  Tolerating it very well no side effects with the medication.  Patient also concerned about a fungal infection of the toenail for which he was on terbinafine for about 4 months or more getting better but still has some.  Medications reviewed labs reviewed patient with anxiety symptoms anger symptoms wanted to be seen by the psychologist but unfortunately the appointment was not able to get it for next to 3 months.  However patient started to feel better since he got a promotion at the workplace.    Labs revealing creatinine at 1.36 better than before.        The following portions of the patient's history were reviewed and updated as appropriate: allergies, current medications, past family history, past medical history, past social history, past surgical history and problem list.    Review of Systems   Constitutional:  Negative for chills and fever.   HENT:  Negative for ear pain and sore throat.    Eyes:  Negative for pain and visual disturbance.   Respiratory:  Negative for cough and shortness of breath.    Cardiovascular:  Negative for chest pain and palpitations.   Gastrointestinal:  Negative for abdominal pain and vomiting.   Genitourinary:  Negative for dysuria and hematuria.   Musculoskeletal:  Negative for arthralgias and back pain.   Skin:  Negative for color change and rash.   Neurological:  Negative for seizures and syncope.   All other systems reviewed  and are negative.        Historical Information   Patient Active Problem List   Diagnosis    Atypical chest pain    Anxiety    Tachycardia    Chronic gout of multiple sites    Acute low back pain    Renal insufficiency    Fungal infection of nail    Obesity (BMI 35.0-39.9 without comorbidity)    Type 2 diabetes mellitus without complication, without long-term current use of insulin (HCC)    Elevated liver enzymes     Past Medical History:   Diagnosis Date    Tachycardia      Past Surgical History:   Procedure Laterality Date    EYE SURGERY      HERNIA REPAIR       Social History     Substance and Sexual Activity   Alcohol Use Yes    Comment: occasional      Social History     Substance and Sexual Activity   Drug Use Never     Social History     Tobacco Use   Smoking Status Former    Types: Cigarettes    Passive exposure: Never   Smokeless Tobacco Never     Family History   Problem Relation Age of Onset    Heart attack Father     Heart attack Family      Health Maintenance Due   Topic    PT PLAN OF CARE     Pneumococcal Vaccine: Pediatrics (0 to 5 Years) and At-Risk Patients (6 to 64 Years) (1 - PCV)    DM Eye Exam     HIV Screening     Annual Physical     DTaP,Tdap,and Td Vaccines (1 - Tdap)    Colorectal Cancer Screening     Influenza Vaccine (1)    COVID-19 Vaccine (7 - 2023-24 season)    Diabetic Foot Exam       Meds/Allergies       Current Outpatient Medications:     ciclopirox (PENLAC) 8 % solution, Apply topically daily at bedtime, Disp: 6 mL, Rfl: 1    febuxostat (ULORIC) 80 MG TABS, Take 1 tablet (80 mg total) by mouth daily, Disp: 90 tablet, Rfl: 0    Multiple Vitamin (ONE-A-DAY MENS PO), Take by mouth, Disp: , Rfl:     sildenafil (VIAGRA) 100 mg tablet, Take 1 tablet (100 mg total) by mouth daily as needed for erectile dysfunction, Disp: 10 tablet, Rfl: 0    tirzepatide (Mounjaro) 10 MG/0.5ML, Inject 0.5 mL (10 mg total) under the skin every 7 days, Disp: 2 mL, Rfl: 0      Objective:    Vitals:   /80  "(BP Location: Right arm, Patient Position: Sitting, Cuff Size: Standard)   Pulse 98   Ht 6' 2\" (1.88 m)   Wt 125 kg (275 lb)   SpO2 93%   BMI 35.31 kg/m²   Body mass index is 35.31 kg/m².  Vitals:    02/12/24 1528   Weight: 125 kg (275 lb)       Physical Exam  Vitals and nursing note reviewed.   Constitutional:       Appearance: Normal appearance.   Cardiovascular:      Rate and Rhythm: Normal rate and regular rhythm.      Heart sounds: Normal heart sounds.   Pulmonary:      Effort: Pulmonary effort is normal.      Breath sounds: Normal breath sounds.   Abdominal:      General: There is no distension.      Palpations: Abdomen is soft.      Tenderness: There is no abdominal tenderness.   Musculoskeletal:         General: Normal range of motion.      Right lower leg: No edema.      Left lower leg: No edema.   Skin:     General: Skin is warm and dry.   Neurological:      General: No focal deficit present.      Mental Status: He is alert and oriented to person, place, and time.         Lab Review   Appointment on 02/10/2024   Component Date Value Ref Range Status    Sodium 02/10/2024 139  135 - 147 mmol/L Final    Potassium 02/10/2024 4.0  3.5 - 5.3 mmol/L Final    Chloride 02/10/2024 106  96 - 108 mmol/L Final    CO2 02/10/2024 27  21 - 32 mmol/L Final    ANION GAP 02/10/2024 6  mmol/L Final    BUN 02/10/2024 21  5 - 25 mg/dL Final    Creatinine 02/10/2024 1.36 (H)  0.60 - 1.30 mg/dL Final    Standardized to IDMS reference method    Glucose, Fasting 02/10/2024 110 (H)  65 - 99 mg/dL Final    Calcium 02/10/2024 9.6  8.4 - 10.2 mg/dL Final    AST 02/10/2024 23  13 - 39 U/L Final    ALT 02/10/2024 29  7 - 52 U/L Final    Specimen collection should occur prior to Sulfasalazine administration due to the potential for falsely depressed results.     Alkaline Phosphatase 02/10/2024 71  34 - 104 U/L Final    Total Protein 02/10/2024 7.1  6.4 - 8.4 g/dL Final    Albumin 02/10/2024 4.8  3.5 - 5.0 g/dL Final    Total " "Bilirubin 02/10/2024 0.67  0.20 - 1.00 mg/dL Final    Use of this assay is not recommended for patients undergoing treatment with eltrombopag due to the potential for falsely elevated results.  N-acetyl-p-benzoquinone imine (metabolite of Acetaminophen) will generate erroneously low results in samples for patients that have taken an overdose of Acetaminophen.    eGFR 02/10/2024 62  ml/min/1.73sq m Final         Sylvester Mora MD        \"This note has been constructed using a voice recognition system.Therefore there may be syntax, spelling, and/or grammatical errors. Please call if you have any questions. \"  "

## 2024-02-14 DIAGNOSIS — E11.9 TYPE 2 DIABETES MELLITUS WITHOUT COMPLICATION, WITHOUT LONG-TERM CURRENT USE OF INSULIN (HCC): ICD-10-CM

## 2024-02-14 NOTE — TELEPHONE ENCOUNTER
Patient called stating that the mounjaro 10mg injection is on back order and would like the 7.5mg to be sent until he is able to get the 10mg. Patient would like a phone call back if this can be done and when. Please review    Reason for call:   [x] Refill   [] Prior Auth  [] Other:     Office:   [x] PCP/Provider -   [] Specialty/Provider -     Medication:   Mounjaro 7.5mg- inject under the skin every 7 days      Pharmacy: [facility]    Does the patient have enough for 3 days?   [] Yes   [x] No - Send as HP to POD

## 2024-02-15 NOTE — TELEPHONE ENCOUNTER
Patient called, to check on the status for Terzapatide 7.5 ml. Patient mentioned that his next injection is due for Monday, but would like to have script sent as soon as possible since he is not sure if pharmacy will have the 7.5 ml dose available. Patient requested a callback from office at [facility], to discuss what pharmacy told him.Please assist

## 2024-02-15 NOTE — TELEPHONE ENCOUNTER
Patient called the RX Refill Line. Message is being forwarded to the office.     Patient called to check the status of this refill request. He is concerned because he is out of this medication. Please advise.   Patient is requesting a call back from Margarette who he stated is in the office    Please contact patient at [phone]

## 2024-02-16 DIAGNOSIS — E11.9 TYPE 2 DIABETES MELLITUS WITHOUT COMPLICATION, WITHOUT LONG-TERM CURRENT USE OF INSULIN (HCC): ICD-10-CM

## 2024-02-16 RX ORDER — TIRZEPATIDE 7.5 MG/.5ML
INJECTION, SOLUTION SUBCUTANEOUS
Qty: 2 ML | Refills: 0 | Status: SHIPPED | OUTPATIENT
Start: 2024-02-16

## 2024-02-16 NOTE — TELEPHONE ENCOUNTER
Patient called in again asking for an update on the 7.5mg to be sent to the pharmacy until he is able to get the 10mg. Patient would like a phone call back if this can be done and when. His next injection is on Monday and does not want to miss a dose.    Call back #: [phone]

## 2024-03-06 ENCOUNTER — TELEPHONE (OUTPATIENT)
Age: 44
End: 2024-03-06

## 2024-03-06 DIAGNOSIS — E11.9 TYPE 2 DIABETES MELLITUS WITHOUT COMPLICATION, WITHOUT LONG-TERM CURRENT USE OF INSULIN (HCC): ICD-10-CM

## 2024-03-06 NOTE — TELEPHONE ENCOUNTER
Patient stated dose will need to stay at 7.5mg since that is the only strength the pharmacy has right now.     Reason for call:   [x] Refill   [] Prior Auth  [] Other:     Office:   [x] PCP/Provider - Sylvester Mora MD   [] Specialty/Provider -     Medication:     Mounjaro 7.5 MG/0.5ML       Dose/Frequency: INJECT 0.5ML UNDER THE SKIN EVERY 7 DAYS     Quantity: 2ml    Pharmacy: [id number] [address]     Does the patient have enough for 3 days?   [x] Yes   [] No - Send as HP to POD

## 2024-03-06 NOTE — TELEPHONE ENCOUNTER
Patient called the RX Refill Line. Message is being forwarded to the office.     Patient is requesting Margarette or Mikayla to give him a call regarding Mounjaro. States it is on backorder and would like to speak to one of them about it    Please contact patient at [phone]

## 2024-03-08 LAB
LEFT EYE DIABETIC RETINOPATHY: NORMAL
RIGHT EYE DIABETIC RETINOPATHY: NORMAL

## 2024-03-21 DIAGNOSIS — E11.9 TYPE 2 DIABETES MELLITUS WITHOUT COMPLICATION, WITHOUT LONG-TERM CURRENT USE OF INSULIN (HCC): ICD-10-CM

## 2024-03-21 NOTE — TELEPHONE ENCOUNTER
Patient just picked up his prescription so he is good for a month but he didn't know if Dr. Mora wanted to keep him on 10mg or increase.    Reason for call:   [x] Refill   [] Prior Auth  [] Other:     Office:   [x] PCP/Provider -   [] Specialty/Provider -     Medication: tirzepatide (Mounjaro) 10 MG/0.5ML     Dose/Frequency: Inject 0.5 mL (10 mg total) under the skin every 7 days     Quantity: 2mL    Pharmacy: [id number] - [place]  [address] [place] [address]     Does the patient have enough for 3 days?   [x] Yes   [] No - Send as HP to POD

## 2024-04-15 NOTE — PROGRESS NOTES
Name: Erik White      : 1980      MRN: [id number]  Encounter Provider: Sylvester Mora MD  Encounter Date: 2024   Encounter department: [facility]    Assessment & Plan     1. Type 2 diabetes mellitus without complication, without long-term current use of insulin ([facility])  Assessment & Plan:    Lab Results   Component Value Date    HGBA1C 6.2 (H) 2023   A1c 6.2 follow-up with repeat lab on Mounjaro    Orders:  -     Comprehensive metabolic panel; Future  -     Hemoglobin A1C; Future; Expected date: 2024  -     UA w Reflex to Microscopic w Reflex to Culture; Future; Expected date: 2024  -     tirzepatide (Mounjaro) 10 MG/0.5ML; Inject 0.5 mL (10 mg total) under the skin every 7 days  -     Albumin / creatinine urine ratio; Future    2. Acute left-sided low back pain with left-sided sciatica  Assessment & Plan:  As mentioned in HPI patient with low back pain especially by evening he gets increasing pain and discomfort radiating down the left lower extremity SLR about 30 to 40 degrees both sides movements of the hip and knee joints not restricted.  Will get physical therapy ordered and also lumbosacral spine x-ray which she had it couple of years ago showed some arthritic changes.  Recommend patient to avoid taking anti-inflammatories like Aleve because of his creatinine coming up slightly high.  Just take Tylenol and physical therapy    Orders:  -     XR spine lumbar minimum 4 views non injury; Future; Expected date: 2024  -     Ambulatory referral to Physical Therapy; Future    3. Chronic gout of multiple sites, unspecified cause  Assessment & Plan:  Patient is currently taking Uloric 80 mg daily half a tablet.  Will get uric acid levels    Orders:  -     Uric acid; Future  -     febuxostat (ULORIC) 80 MG TABS; Take 1 tablet (80 mg total) by mouth daily    4. Renal insufficiency  Assessment & Plan:  Will follow-up with repeat creatinine level      5.  Anxiety  Assessment & Plan:  Anxiety symptoms less compared to before so far he has not received a phone call from the psychologist he spoke to them once on the phone for the appointment.      6. Fungal infection of nail  Assessment & Plan:  Currently patient is applying Penlac solution to the toenails      7. Elevated liver enzymes  Assessment & Plan:  Will follow-up with repeat lipid profile and liver function test      8. Obesity (BMI 35.0-39.9 without comorbidity)  Assessment & Plan:  Patient lost total of 48 pounds in last 5 months time on Mounjaro we will continue      9. Tachycardia    10. Upper respiratory tract infection, unspecified type  Assessment & Plan:  Patient with upper respiratory tract infection symptoms COVID-negative throat is mildly congested otherwise unremarkable symptomatic treatment if necessary Z-Kvng    Orders:  -     azithromycin (ZITHROMAX) 250 mg tablet; Take 2 the first day, then take 1 daily    11. Screening for thyroid disorder  -     TSH, 3rd generation with Free T4 reflex; Future; Expected date: 04/16/2024    12. Screening for deficiency anemia  -     CBC and differential; Future    13. Dyslipidemia  -     Lipid panel; Future           Subjective      Patient is coming here for a follow-up evaluation with regards to symptoms of URI scratchy throat feeling postnasal dripping no fever he has checked for COVID which was negative.  Patient also complains of low back pain especially by the evening he gets increasing pain discomfort in the left side in the back and radiating down the left lower extremity occasional tingling numbness sensation also this has been an ongoing problem for several months now it is a more.  Patient has lost about 48 pounds in the last 5 months time on Mounjaro.  Patient has been trying to see a psychologist for his anxiety symptoms but so far he has not received any phone call back from the department.  Medications reviewed labs reviewed.  No chest pains  "palpitation shortness of breath.      Review of Systems   Constitutional:  Negative for chills and fever.   HENT:  Negative for ear pain and sore throat.    Eyes:  Negative for pain and visual disturbance.   Respiratory:  Negative for cough and shortness of breath.    Cardiovascular:  Negative for chest pain and palpitations.   Gastrointestinal:  Negative for abdominal pain and vomiting.   Genitourinary:  Negative for dysuria and hematuria.   Musculoskeletal:  Positive for arthralgias and back pain.   Skin:  Negative for color change and rash.   Neurological:  Negative for seizures and syncope.   All other systems reviewed and are negative.      Current Outpatient Medications on File Prior to Visit   Medication Sig   • ciclopirox (PENLAC) 8 % solution Apply topically daily at bedtime   • Multiple Vitamin (ONE-A-DAY MENS PO) Take by mouth   • sildenafil (VIAGRA) 100 mg tablet Take 1 tablet (100 mg total) by mouth daily as needed for erectile dysfunction   • [DISCONTINUED] febuxostat (ULORIC) 80 MG TABS Take 1 tablet (80 mg total) by mouth daily   • [DISCONTINUED] tirzepatide (Mounjaro) 10 MG/0.5ML Inject 0.5 mL (10 mg total) under the skin every 7 days       Objective     /76 (BP Location: Right arm, Patient Position: Sitting, Cuff Size: Standard)   Pulse 86   Ht 6' 2\" (1.88 m)   Wt 127 kg (280 lb)   SpO2 99%   BMI 35.95 kg/m²     Physical Exam  Vitals and nursing note reviewed.   Constitutional:       Appearance: Normal appearance.   Cardiovascular:      Rate and Rhythm: Normal rate and regular rhythm.      Pulses: no weak pulses.           Dorsalis pedis pulses are 1+ on the right side and 1+ on the left side.        Posterior tibial pulses are 1+ on the right side and 1+ on the left side.      Heart sounds: Normal heart sounds.   Pulmonary:      Effort: Pulmonary effort is normal.      Breath sounds: Normal breath sounds.   Abdominal:      Palpations: Abdomen is soft.   Musculoskeletal:      Right lower " leg: No edema.      Left lower leg: No edema.      Comments: Movements of the spine causing mild discomfort   Feet:      Right foot:      Skin integrity: No ulcer, skin breakdown, erythema, warmth, callus or dry skin.      Left foot:      Skin integrity: No ulcer, skin breakdown, erythema, warmth, callus or dry skin.   Skin:     General: Skin is warm and dry.   Neurological:      Mental Status: He is alert and oriented to person, place, and time.   Psychiatric:         Mood and Affect: Mood normal.       Diabetic Foot Exam    Patient's shoes and socks removed.    Right Foot/Ankle   Right Foot Inspection  Skin Exam: skin normal and skin intact. No dry skin, no warmth, no callus, no erythema, no maceration, no abnormal color, no pre-ulcer, no ulcer and no callus.     Toe Exam: ROM and strength within normal limits.     Sensory   Monofilament testing: intact    Vascular  The right DP pulse is 1+. The right PT pulse is 1+.     Left Foot/Ankle  Left Foot Inspection  Skin Exam: skin normal and skin intact. No dry skin, no warmth, no erythema, no maceration, normal color, no pre-ulcer, no ulcer and no callus.     Toe Exam: ROM and strength within normal limits.     Sensory   Monofilament testing: intact    Vascular  The left DP pulse is 1+. The left PT pulse is 1+.     Assign Risk Category  No deformity present  No loss of protective sensation  No weak pulses  Risk: 0    Recent Results (from the past 1344 hour(s))    Diabetes Eye Exam    Collection Time: 03/08/24 11:38 AM   Result Value Ref Range    Right Eye Diabetic Retinopathy None     Left Eye Diabetic Retinopathy None       Sylvester Mora MD

## 2024-04-16 ENCOUNTER — OFFICE VISIT (OUTPATIENT)
Dept: FAMILY MEDICINE CLINIC | Facility: CLINIC | Age: 44
End: 2024-04-16
Payer: COMMERCIAL

## 2024-04-16 VITALS
WEIGHT: 280 LBS | OXYGEN SATURATION: 99 % | DIASTOLIC BLOOD PRESSURE: 76 MMHG | SYSTOLIC BLOOD PRESSURE: 120 MMHG | HEART RATE: 86 BPM | HEIGHT: 74 IN | BODY MASS INDEX: 35.94 KG/M2

## 2024-04-16 DIAGNOSIS — E66.9 OBESITY (BMI 35.0-39.9 WITHOUT COMORBIDITY): ICD-10-CM

## 2024-04-16 DIAGNOSIS — R74.8 ELEVATED LIVER ENZYMES: ICD-10-CM

## 2024-04-16 DIAGNOSIS — R00.0 TACHYCARDIA: ICD-10-CM

## 2024-04-16 DIAGNOSIS — B35.1 FUNGAL INFECTION OF NAIL: ICD-10-CM

## 2024-04-16 DIAGNOSIS — N28.9 RENAL INSUFFICIENCY: ICD-10-CM

## 2024-04-16 DIAGNOSIS — E78.5 DYSLIPIDEMIA: ICD-10-CM

## 2024-04-16 DIAGNOSIS — M1A.09X0 CHRONIC GOUT OF MULTIPLE SITES, UNSPECIFIED CAUSE: ICD-10-CM

## 2024-04-16 DIAGNOSIS — E11.9 TYPE 2 DIABETES MELLITUS WITHOUT COMPLICATION, WITHOUT LONG-TERM CURRENT USE OF INSULIN (HCC): Primary | ICD-10-CM

## 2024-04-16 DIAGNOSIS — F41.9 ANXIETY: ICD-10-CM

## 2024-04-16 DIAGNOSIS — M54.42 ACUTE LEFT-SIDED LOW BACK PAIN WITH LEFT-SIDED SCIATICA: ICD-10-CM

## 2024-04-16 DIAGNOSIS — Z13.29 SCREENING FOR THYROID DISORDER: ICD-10-CM

## 2024-04-16 DIAGNOSIS — Z13.0 SCREENING FOR DEFICIENCY ANEMIA: ICD-10-CM

## 2024-04-16 DIAGNOSIS — J06.9 UPPER RESPIRATORY TRACT INFECTION, UNSPECIFIED TYPE: ICD-10-CM

## 2024-04-16 PROCEDURE — 99214 OFFICE O/P EST MOD 30 MIN: CPT | Performed by: INTERNAL MEDICINE

## 2024-04-16 RX ORDER — AZITHROMYCIN 250 MG/1
TABLET, FILM COATED ORAL
Qty: 6 TABLET | Refills: 0 | Status: SHIPPED | OUTPATIENT
Start: 2024-04-16 | End: 2024-04-21

## 2024-04-16 RX ORDER — FEBUXOSTAT 80 MG/1
80 TABLET, FILM COATED ORAL DAILY
Qty: 90 TABLET | Refills: 1 | Status: SHIPPED | OUTPATIENT
Start: 2024-04-16

## 2024-04-16 NOTE — ASSESSMENT & PLAN NOTE
As mentioned in HPI patient with low back pain especially by evening he gets increasing pain and discomfort radiating down the left lower extremity SLR about 30 to 40 degrees both sides movements of the hip and knee joints not restricted.  Will get physical therapy ordered and also lumbosacral spine x-ray which she had it couple of years ago showed some arthritic changes.  Recommend patient to avoid taking anti-inflammatories like Aleve because of his creatinine coming up slightly high.  Just take Tylenol and physical therapy

## 2024-04-16 NOTE — ASSESSMENT & PLAN NOTE
Anxiety symptoms less compared to before so far he has not received a phone call from the psychologist he spoke to them once on the phone for the appointment.

## 2024-04-16 NOTE — ASSESSMENT & PLAN NOTE
Lab Results   Component Value Date    HGBA1C 6.2 (H) 12/09/2023   A1c 6.2 follow-up with repeat lab on [facility]

## 2024-04-16 NOTE — ASSESSMENT & PLAN NOTE
Patient with upper respiratory tract infection symptoms COVID-negative throat is mildly congested otherwise unremarkable symptomatic treatment if necessary Z-Kvng

## 2024-05-05 ENCOUNTER — APPOINTMENT (OUTPATIENT)
Dept: LAB | Facility: CLINIC | Age: 44
End: 2024-05-05
Payer: COMMERCIAL

## 2024-05-05 DIAGNOSIS — M1A.09X0 CHRONIC GOUT OF MULTIPLE SITES, UNSPECIFIED CAUSE: ICD-10-CM

## 2024-05-05 DIAGNOSIS — E11.9 TYPE 2 DIABETES MELLITUS WITHOUT COMPLICATION, WITHOUT LONG-TERM CURRENT USE OF INSULIN (HCC): ICD-10-CM

## 2024-05-05 DIAGNOSIS — E78.5 DYSLIPIDEMIA: ICD-10-CM

## 2024-05-05 DIAGNOSIS — Z13.0 SCREENING FOR DEFICIENCY ANEMIA: ICD-10-CM

## 2024-05-05 DIAGNOSIS — Z13.29 SCREENING FOR THYROID DISORDER: ICD-10-CM

## 2024-05-05 LAB
ALBUMIN SERPL BCP-MCNC: 4.6 G/DL (ref 3.5–5)
ALP SERPL-CCNC: 70 U/L (ref 34–104)
ALT SERPL W P-5'-P-CCNC: 31 U/L (ref 7–52)
ANION GAP SERPL CALCULATED.3IONS-SCNC: 6 MMOL/L (ref 4–13)
AST SERPL W P-5'-P-CCNC: 23 U/L (ref 13–39)
BASOPHILS # BLD AUTO: 0.04 THOUSANDS/ÂΜL (ref 0–0.1)
BASOPHILS NFR BLD AUTO: 1 % (ref 0–1)
BILIRUB SERPL-MCNC: 0.56 MG/DL (ref 0.2–1)
BILIRUB UR QL STRIP: NEGATIVE
BUN SERPL-MCNC: 20 MG/DL (ref 5–25)
CALCIUM SERPL-MCNC: 9.4 MG/DL (ref 8.4–10.2)
CHLORIDE SERPL-SCNC: 103 MMOL/L (ref 96–108)
CHOLEST SERPL-MCNC: 181 MG/DL
CLARITY UR: CLEAR
CO2 SERPL-SCNC: 30 MMOL/L (ref 21–32)
COLOR UR: NORMAL
CREAT SERPL-MCNC: 1.44 MG/DL (ref 0.6–1.3)
CREAT UR-MCNC: 154.6 MG/DL
EOSINOPHIL # BLD AUTO: 0.08 THOUSAND/ÂΜL (ref 0–0.61)
EOSINOPHIL NFR BLD AUTO: 1 % (ref 0–6)
ERYTHROCYTE [DISTWIDTH] IN BLOOD BY AUTOMATED COUNT: 12.2 % (ref 11.6–15.1)
EST. AVERAGE GLUCOSE BLD GHB EST-MCNC: 114 MG/DL
GFR SERPL CREATININE-BSD FRML MDRD: 58 ML/MIN/1.73SQ M
GLUCOSE P FAST SERPL-MCNC: 102 MG/DL (ref 65–99)
GLUCOSE UR STRIP-MCNC: NEGATIVE MG/DL
HBA1C MFR BLD: 5.6 %
HCT VFR BLD AUTO: 44.6 % (ref 36.5–49.3)
HDLC SERPL-MCNC: 49 MG/DL
HGB BLD-MCNC: 15 G/DL (ref 12–17)
HGB UR QL STRIP.AUTO: NEGATIVE
IMM GRANULOCYTES # BLD AUTO: 0.06 THOUSAND/UL (ref 0–0.2)
IMM GRANULOCYTES NFR BLD AUTO: 1 % (ref 0–2)
KETONES UR STRIP-MCNC: NEGATIVE MG/DL
LDLC SERPL CALC-MCNC: 108 MG/DL (ref 0–100)
LEUKOCYTE ESTERASE UR QL STRIP: NEGATIVE
LYMPHOCYTES # BLD AUTO: 2.96 THOUSANDS/ÂΜL (ref 0.6–4.47)
LYMPHOCYTES NFR BLD AUTO: 34 % (ref 14–44)
MCH RBC QN AUTO: 30.7 PG (ref 26.8–34.3)
MCHC RBC AUTO-ENTMCNC: 33.6 G/DL (ref 31.4–37.4)
MCV RBC AUTO: 91 FL (ref 82–98)
MICROALBUMIN UR-MCNC: 11.2 MG/L
MICROALBUMIN/CREAT 24H UR: 7 MG/G CREATININE (ref 0–30)
MONOCYTES # BLD AUTO: 0.82 THOUSAND/ÂΜL (ref 0.17–1.22)
MONOCYTES NFR BLD AUTO: 10 % (ref 4–12)
NEUTROPHILS # BLD AUTO: 4.64 THOUSANDS/ÂΜL (ref 1.85–7.62)
NEUTS SEG NFR BLD AUTO: 53 % (ref 43–75)
NITRITE UR QL STRIP: NEGATIVE
NONHDLC SERPL-MCNC: 132 MG/DL
NRBC BLD AUTO-RTO: 0 /100 WBCS
PH UR STRIP.AUTO: 5 [PH]
PLATELET # BLD AUTO: 187 THOUSANDS/UL (ref 149–390)
PMV BLD AUTO: 11.6 FL (ref 8.9–12.7)
POTASSIUM SERPL-SCNC: 4.6 MMOL/L (ref 3.5–5.3)
PROT SERPL-MCNC: 6.8 G/DL (ref 6.4–8.4)
PROT UR STRIP-MCNC: NEGATIVE MG/DL
RBC # BLD AUTO: 4.89 MILLION/UL (ref 3.88–5.62)
SODIUM SERPL-SCNC: 139 MMOL/L (ref 135–147)
SP GR UR STRIP.AUTO: 1.02 (ref 1–1.03)
TRIGL SERPL-MCNC: 121 MG/DL
TSH SERPL DL<=0.05 MIU/L-ACNC: 1.56 UIU/ML (ref 0.45–4.5)
URATE SERPL-MCNC: 5.7 MG/DL (ref 3.5–8.5)
UROBILINOGEN UR STRIP-ACNC: <2 MG/DL
WBC # BLD AUTO: 8.6 THOUSAND/UL (ref 4.31–10.16)

## 2024-05-05 PROCEDURE — 82043 UR ALBUMIN QUANTITATIVE: CPT

## 2024-05-05 PROCEDURE — 84550 ASSAY OF BLOOD/URIC ACID: CPT

## 2024-05-05 PROCEDURE — 36415 COLL VENOUS BLD VENIPUNCTURE: CPT

## 2024-05-05 PROCEDURE — 84443 ASSAY THYROID STIM HORMONE: CPT

## 2024-05-05 PROCEDURE — 82570 ASSAY OF URINE CREATININE: CPT

## 2024-05-05 PROCEDURE — 83036 HEMOGLOBIN GLYCOSYLATED A1C: CPT

## 2024-05-05 PROCEDURE — 3044F HG A1C LEVEL LT 7.0%: CPT | Performed by: INTERNAL MEDICINE

## 2024-05-05 PROCEDURE — 80053 COMPREHEN METABOLIC PANEL: CPT

## 2024-05-05 PROCEDURE — 81003 URINALYSIS AUTO W/O SCOPE: CPT

## 2024-05-05 PROCEDURE — 85025 COMPLETE CBC W/AUTO DIFF WBC: CPT

## 2024-05-05 PROCEDURE — 80061 LIPID PANEL: CPT

## 2024-05-09 NOTE — PROGRESS NOTES
PT Evaluation     Today's date: 5/10/2024  Patient name: Erik White  : 1980  MRN: [id number]  Referring provider: Sylvester Mora MD  Dx:   Encounter Diagnosis     ICD-10-CM    1. Acute left-sided low back pain with left-sided sciatica  M54.42 Ambulatory referral to Physical Therapy          Start Time: 0705  Stop Time: 0750  Total time in clinic (min): 45 minutes    Assessment  Assessment details: Erik White is a 43 y.o. male present with:   Acute left-sided low back pain with left-sided sciatica    Erik has the above listed impairments and will benefit from skilled Physical Therapist management to improve deficits to return to prior level of function. Erik's symptoms consistent with stenotic presentation, will focus on spinal stability/endurance. Promote overall endurance of musculature and loading, getting him to squatting and standing exercises as able.    Impairments: abnormal muscle firing, abnormal or restricted ROM, activity intolerance, impaired physical strength, lacks appropriate home exercise program and pain with function    Symptom irritability: moderateBarriers to therapy: $60 copay, multiple hernia repairs in past, chronicity  Understanding of Dx/Px/POC: good   Prognosis: good    Goals  Impairment Goals  - Decrease pain 0/10  - Improve ROM to 110 degrees knee flexion via ely's test  - Increase strength to 5/5 throughout    Functional Goals  - Return to Prior Level of Function  - Increase Functional Status Measure to: 75  - Patient will be independent with HEP  -Patient will be able to return to walking with family   - paitent will be able to return to bike riding    Plan  Patient would benefit from: skilled PT  Planned therapy interventions: joint mobilization, manual therapy, patient education, postural training, activity modification, abdominal trunk stabilization, body mechanics training, flexibility, functional ROM exercises, graded exercise, home exercise program,  neuromuscular re-education, strengthening, stretching, therapeutic activities and therapeutic exercise  Frequency: 2x week  Duration in weeks: 8  Plan of Care beginning date: 5/10/2024  Plan of Care expiration date: 2024  Treatment plan discussed with: patient        Subjective Evaluation    History of Present Illness  Mechanism of injury: Erik complains of low back pain especially by the evening he gets increasing pain discomfort in the left side in the back and radiating down the left lower extremity occasional tingling numbness sensation also this has been an ongoing problem for several months now it is more pronounced. Better with sitting or lying on his back with knees bent and on his left side.   Worse with prolonged standing, by the end of the day the pain radiates to posterior aspect of left hamstring.             Recurrent probem    Patient Goals  Patient goals for therapy: increased strength, increased motion, decreased pain and independence with ADLs/IADLs  Patient goal: Return to bike riding  Pain  Current pain ratin  At worst pain ratin  Location: L side of low back  Quality: dull ache and tight  Aggravating factors: walking and standing  Progression: no change    Social Support    Employment status: working (construction mgmt)        Objective     Concurrent Complaints      Additional Special Questions  Reports umbilical hernia and multiple repairs in the past     Palpation   Left   No palpable tenderness to the erector spinae and iliopsoas.     Right   No palpable tenderness to the erector spinae and iliopsoas.     Active Range of Motion     Lumbar   Flexion:  WFL  Extension:  Restriction level: minimal  Left lateral flexion:  with pain Restriction level: minimal  Right lateral flexion:  with pain Restriction level: minimal  Right rotation:  with pain Restriction level: moderate    Additional Active Range of Motion Details  Pain/stretch felt during lateral flexion both  directions    Joint Play   Joints within functional limits: L1, L2, L3 and L4     Strength/Myotome Testing     Left Hip   Planes of Motion   Extension: WFL  Abduction: WFL    Right Hip   Planes of Motion   Extension: WFL  Abduction: WFL    Left Knee   Flexion: WFL  Extension: WFL    Right Knee   Flexion: WFL  Extension: WFL    Tests     Lumbar     Left   Negative crossed SLR and passive SLR.     Right   Negative crossed SLR and passive SLR.     Left Hip   Positive Ely's and DONTA.     Right Hip   Positive Ely's and DONTA.                   Precautions: multiple hernia repairs in past,     Daily Treatment Diary      Manual 05/10/24             LAD nv                         Therapeutic Ex                    bike nv                   Supine QL stretch* 10x5s ea            Prone Quad stretch w strap* 3x30s                                      Neuro Re-ed                                              ADIM marches* 2x10                   ADIM heel taps nv                   Bridges* 3x10                                       Low Rows nv                                Therapeutic Activity                          Leg Press nv                   Goblet squat                    Pallof Press nv                                                                                                                                                                                                                                    Modalities                                                    *=on HEP

## 2024-05-10 ENCOUNTER — EVALUATION (OUTPATIENT)
Dept: PHYSICAL THERAPY | Facility: REHABILITATION | Age: 44
End: 2024-05-10
Payer: COMMERCIAL

## 2024-05-10 DIAGNOSIS — M54.42 ACUTE LEFT-SIDED LOW BACK PAIN WITH LEFT-SIDED SCIATICA: ICD-10-CM

## 2024-05-10 PROCEDURE — 97112 NEUROMUSCULAR REEDUCATION: CPT | Performed by: PHYSICAL THERAPIST

## 2024-05-10 PROCEDURE — 97161 PT EVAL LOW COMPLEX 20 MIN: CPT | Performed by: PHYSICAL THERAPIST

## 2024-05-10 PROCEDURE — 97110 THERAPEUTIC EXERCISES: CPT | Performed by: PHYSICAL THERAPIST

## 2024-05-14 ENCOUNTER — OFFICE VISIT (OUTPATIENT)
Dept: PHYSICAL THERAPY | Facility: REHABILITATION | Age: 44
End: 2024-05-14
Payer: COMMERCIAL

## 2024-05-14 DIAGNOSIS — M54.42 ACUTE LEFT-SIDED LOW BACK PAIN WITH LEFT-SIDED SCIATICA: Primary | ICD-10-CM

## 2024-05-14 PROCEDURE — 97112 NEUROMUSCULAR REEDUCATION: CPT | Performed by: PHYSICAL THERAPIST

## 2024-05-14 PROCEDURE — 97110 THERAPEUTIC EXERCISES: CPT | Performed by: PHYSICAL THERAPIST

## 2024-05-14 PROCEDURE — 97530 THERAPEUTIC ACTIVITIES: CPT | Performed by: PHYSICAL THERAPIST

## 2024-05-14 NOTE — PROGRESS NOTES
Daily Note     Today's date: 2024  Patient name: Erik White  : 1980  MRN: [id number]  Referring provider: Sylvester Mora MD  Dx:   Encounter Diagnosis     ICD-10-CM    1. Acute left-sided low back pain with left-sided sciatica  M54.42           Start Time: 0702  Stop Time: 0745  Total time in clinic (min): 43 minutes    Subjective: Erik reports that his back was sore after eval, denies questions on HEP.       Objective: See treatment diary below      Assessment: Tolerated treatment well. Patient demonstrated fatigue post treatment, exhibited good technique with therapeutic exercises, and would benefit from continued PT.       Plan: Continue per plan of care.            Precautions: multiple hernia repairs in past,     Daily Treatment Diary      Manual 05/10/24  5/14           LAD nv nv                        Therapeutic Ex                    bike nv 5' lvl 1                  Supine QL stretch* 10x5s ea 5x10s ea           Prone Quad stretch w strap* 3x30s 3x30s ea           Seated ball rolls end of session flx/rot  10x5s ea                        Neuro Re-ed                                              ADIM marches* 2x10 10x                  ADIM heel taps* nv 2x10                  Bridges* 3x10 np                  Bridge w march*  3x10                  Low Rows slastix bands nv Red 3x10                               Therapeutic Activity                          Leg Press nv nv                  Goblet squat  15# 3x10                  Pallof Press nv nv                                                                                                                                                                                                                                   Modalities                                                    *=on HEP

## 2024-05-16 ENCOUNTER — APPOINTMENT (OUTPATIENT)
Dept: PHYSICAL THERAPY | Facility: REHABILITATION | Age: 44
End: 2024-05-16
Payer: COMMERCIAL

## 2024-05-20 ENCOUNTER — TELEPHONE (OUTPATIENT)
Dept: FAMILY MEDICINE CLINIC | Facility: CLINIC | Age: 44
End: 2024-05-20

## 2024-05-21 ENCOUNTER — OFFICE VISIT (OUTPATIENT)
Dept: FAMILY MEDICINE CLINIC | Facility: CLINIC | Age: 44
End: 2024-05-21
Payer: COMMERCIAL

## 2024-05-21 VITALS
DIASTOLIC BLOOD PRESSURE: 76 MMHG | WEIGHT: 282 LBS | OXYGEN SATURATION: 95 % | HEART RATE: 105 BPM | SYSTOLIC BLOOD PRESSURE: 118 MMHG | HEIGHT: 74 IN | BODY MASS INDEX: 36.19 KG/M2

## 2024-05-21 DIAGNOSIS — R74.8 ELEVATED LIVER ENZYMES: ICD-10-CM

## 2024-05-21 DIAGNOSIS — B35.1 FUNGAL INFECTION OF NAIL: ICD-10-CM

## 2024-05-21 DIAGNOSIS — N28.9 RENAL INSUFFICIENCY: ICD-10-CM

## 2024-05-21 DIAGNOSIS — M54.42 ACUTE LEFT-SIDED LOW BACK PAIN WITH LEFT-SIDED SCIATICA: Primary | ICD-10-CM

## 2024-05-21 DIAGNOSIS — E66.9 OBESITY (BMI 35.0-39.9 WITHOUT COMORBIDITY): ICD-10-CM

## 2024-05-21 DIAGNOSIS — F41.9 ANXIETY: ICD-10-CM

## 2024-05-21 DIAGNOSIS — K42.9 UMBILICAL HERNIA WITHOUT OBSTRUCTION AND WITHOUT GANGRENE: ICD-10-CM

## 2024-05-21 DIAGNOSIS — M1A.09X0 CHRONIC GOUT OF MULTIPLE SITES, UNSPECIFIED CAUSE: ICD-10-CM

## 2024-05-21 DIAGNOSIS — R00.0 TACHYCARDIA: ICD-10-CM

## 2024-05-21 DIAGNOSIS — E11.9 TYPE 2 DIABETES MELLITUS WITHOUT COMPLICATION, WITHOUT LONG-TERM CURRENT USE OF INSULIN (HCC): ICD-10-CM

## 2024-05-21 PROCEDURE — 99214 OFFICE O/P EST MOD 30 MIN: CPT | Performed by: INTERNAL MEDICINE

## 2024-05-21 RX ORDER — METHYLPREDNISOLONE 4 MG/1
TABLET ORAL
Qty: 21 EACH | Refills: 0 | Status: SHIPPED | OUTPATIENT
Start: 2024-05-21

## 2024-05-21 NOTE — PROGRESS NOTES
Office Visit Note  24     Erik White 43 y.o. male MRN: [id number]  : 1980    Assessment:     1. Acute left-sided low back pain with left-sided sciatica  Assessment & Plan:  As mentioned in history of present illness patient continues to experience increasing pain in the low back area radiating in the left lower extremity undergoing physical therapy making it worse x-rays of the lumbar spine taken couple of years ago was showing some arthritic changes we are avoiding anti-inflammatories because of the increased creatinine levels.  Patient currently taking Tylenol and heating pad.  Will get an MRI of the lumbar spine after making sure regarding metal in the body patient had surgery in the face with titanium and screws based on the findings make decisions and follow-up.  Meanwhile we will start the patient on Medrol pack  Orders:  -     methylPREDNISolone 4 MG tablet therapy pack; Use as directed on package  -     MRI lumbar spine wo contrast; Future; Expected date: 2024  2. Umbilical hernia without obstruction and without gangrene  Assessment & Plan:  Patient with umbilical hernia examination shows presence of the same no tenderness noted will have him seen by the surgeon for further evaluation and treatment  Orders:  -     Ambulatory referral to General Surgery; Future  3. Chronic gout of multiple sites, unspecified cause  Assessment & Plan:  No episodes of gout in the recent past stable  4. Obesity (BMI 35.0-39.9 without comorbidity)  Assessment & Plan:  Patient is on Mounjaro injection once a week which is also helping with weight loss as well as controlling his blood sugars will continue today's weight is 282 pounds patient lost about 50 pounds in the last 5 months time  5. Renal insufficiency  Assessment & Plan:  Lab reveals creatinine at 1.44 with a GFR of 58 we will continue to monitor with periodic labs avoid nonsteroidal anti-inflammatory medications  6. Type 2 diabetes mellitus  without complication, without long-term current use of insulin (HCC)  Assessment & Plan:    Lab Results   Component Value Date    HGBA1C 5.6 05/05/2024   A1c came down to 5.6 we will continue with Mounjaro  7. Fungal infection of nail  8. Elevated liver enzymes  9. Tachycardia  10. Anxiety  Assessment & Plan:  Anxiety symptoms are stable at present time.             Discussion Summary and Plan:  Today's care plan and medications were reviewed with patient in detail and all their questions answered to their satisfaction.    Chief Complaint   Patient presents with   • Back Pain      Subjective:  Patient is coming here for a follow-up evaluation regarding symptoms of low back pain radiating down the left lower extremities associated with tingling numbness sensation.  Pain is more so when he is standing and neck stiffness is present on the he was some associated abdominal discomfort feeling..  Pain is in the lumbar spine burning and shooting in nature radiating to the left thigh sometimes pain severity is 9/10 especially in the nighttime it is aggravated by standing and walking and stiffness is also present on the patient is going for physical therapy but the pain is getting worse.    Patient also has got umbilical hernia.  Medication reviewed labs reviewed physical therapy notes appreciated.        Back Pain  This is a new problem. The current episode started more than 1 month ago. The problem occurs constantly. The problem has been gradually worsening since onset. The pain is present in the lumbar spine. The quality of the pain is described as burning and shooting. The pain radiates to the left thigh. The pain is at a severity of 9/10. The pain is severe. The pain is Worse during the night. The symptoms are aggravated by standing. Stiffness is present All day. Pertinent negatives include no abdominal pain, chest pain, dysuria or fever. He has tried home exercises for the symptoms. The treatment provided no relief.        The following portions of the patient's history were reviewed and updated as appropriate: allergies, current medications, past family history, past medical history, past social history, past surgical history and problem list.    Review of Systems   Constitutional:  Negative for chills and fever.   HENT:  Negative for ear pain and sore throat.    Eyes:  Negative for pain and visual disturbance.   Respiratory:  Negative for cough and shortness of breath.    Cardiovascular:  Negative for chest pain and palpitations.   Gastrointestinal:  Negative for abdominal pain and vomiting.   Genitourinary:  Negative for dysuria and hematuria.   Musculoskeletal:  Positive for back pain. Negative for arthralgias.   Skin:  Negative for color change and rash.   Neurological:  Negative for seizures and syncope.   All other systems reviewed and are negative.        Historical Information   Patient Active Problem List   Diagnosis   • Atypical chest pain   • Anxiety   • Tachycardia   • Chronic gout of multiple sites   • Acute low back pain   • Renal insufficiency   • Fungal infection of nail   • Obesity (BMI 35.0-39.9 without comorbidity)   • Type 2 diabetes mellitus without complication, without long-term current use of insulin (HCC)   • Elevated liver enzymes   • Upper respiratory infection   • Umbilical hernia without obstruction and without gangrene     Past Medical History:   Diagnosis Date   • Tachycardia      Past Surgical History:   Procedure Laterality Date   • EYE SURGERY     • HERNIA REPAIR       Social History     Substance and Sexual Activity   Alcohol Use Yes    Comment: occasional      Social History     Substance and Sexual Activity   Drug Use Never     Social History     Tobacco Use   Smoking Status Former   • Types: Cigarettes   • Passive exposure: Never   Smokeless Tobacco Never     Family History   Problem Relation Age of Onset   • Heart attack Father    • Heart attack Family      Health Maintenance Due   Topic  "  • Pneumococcal Vaccine: Pediatrics (0 to 5 Years) and At-Risk Patients (6 to 64 Years) (1 of 2 - PCV)   • HIV Screening    • Annual Physical    • DTaP,Tdap,and Td Vaccines (1 - Tdap)   • Colorectal Cancer Screening    • COVID-19 Vaccine (7 - 2023-24 season)      Meds/Allergies       Current Outpatient Medications:   •  ciclopirox (PENLAC) 8 % solution, Apply topically daily at bedtime, Disp: 6 mL, Rfl: 1  •  febuxostat (ULORIC) 80 MG TABS, Take 1 tablet (80 mg total) by mouth daily, Disp: 90 tablet, Rfl: 1  •  methylPREDNISolone 4 MG tablet therapy pack, Use as directed on package, Disp: 21 each, Rfl: 0  •  Multiple Vitamin (ONE-A-DAY MENS PO), Take by mouth, Disp: , Rfl:   •  sildenafil (VIAGRA) 100 mg tablet, Take 1 tablet (100 mg total) by mouth daily as needed for erectile dysfunction, Disp: 10 tablet, Rfl: 0  •  tirzepatide (Mounjaro) 10 MG/0.5ML, Inject 0.5 mL (10 mg total) under the skin every 7 days, Disp: 2 mL, Rfl: 5      Objective:    Vitals:   /76 (BP Location: Right arm, Patient Position: Sitting, Cuff Size: Large)   Pulse 105   Ht 6' 2\" (1.88 m)   Wt 128 kg (282 lb)   SpO2 95%   BMI 36.21 kg/m²   Body mass index is 36.21 kg/m².  Vitals:    05/21/24 1055   Weight: 128 kg (282 lb)       Physical Exam  Vitals and nursing note reviewed.   Constitutional:       Appearance: Normal appearance.   Cardiovascular:      Rate and Rhythm: Normal rate and regular rhythm.      Heart sounds: Normal heart sounds.   Pulmonary:      Effort: Pulmonary effort is normal.      Breath sounds: Normal breath sounds.   Abdominal:      General: Abdomen is flat.      Palpations: Abdomen is soft.      Hernia: A hernia is present.      Comments: Patient has umbilical hernia   Musculoskeletal:      Right lower leg: No edema.      Left lower leg: No edema.      Comments: Mild tenderness noted lumbar spine area.  Lumbar spine movements causing increasing pain and discomfort SLR is positive on the left side.  Some weakness " also noted compared to the left side   Skin:     General: Skin is warm and dry.   Neurological:      Mental Status: He is alert and oriented to person, place, and time.      Motor: Weakness present.      Deep Tendon Reflexes: Reflexes abnormal.      Comments: Some weakness is noted in the left lower extremity slightly decreased sensation reflexes 1+   Psychiatric:         Mood and Affect: Mood normal.         Behavior: Behavior normal.         Lab Review   Appointment on 05/05/2024   Component Date Value Ref Range Status   • WBC 05/05/2024 8.60  4.31 - 10.16 Thousand/uL Final   • RBC 05/05/2024 4.89  3.88 - 5.62 Million/uL Final   • Hemoglobin 05/05/2024 15.0  12.0 - 17.0 g/dL Final   • Hematocrit 05/05/2024 44.6  36.5 - 49.3 % Final   • MCV 05/05/2024 91  82 - 98 fL Final   • MCH 05/05/2024 30.7  26.8 - 34.3 pg Final   • MCHC 05/05/2024 33.6  31.4 - 37.4 g/dL Final   • RDW 05/05/2024 12.2  11.6 - 15.1 % Final   • MPV 05/05/2024 11.6  8.9 - 12.7 fL Final   • Platelets 05/05/2024 187  149 - 390 Thousands/uL Final   • nRBC 05/05/2024 0  /100 WBCs Final   • Segmented % 05/05/2024 53  43 - 75 % Final   • Immature Grans % 05/05/2024 1  0 - 2 % Final   • Lymphocytes % 05/05/2024 34  14 - 44 % Final   • Monocytes % 05/05/2024 10  4 - 12 % Final   • Eosinophils Relative 05/05/2024 1  0 - 6 % Final   • Basophils Relative 05/05/2024 1  0 - 1 % Final   • Absolute Neutrophils 05/05/2024 4.64  1.85 - 7.62 Thousands/µL Final   • Absolute Immature Grans 05/05/2024 0.06  0.00 - 0.20 Thousand/uL Final   • Absolute Lymphocytes 05/05/2024 2.96  0.60 - 4.47 Thousands/µL Final   • Absolute Monocytes 05/05/2024 0.82  0.17 - 1.22 Thousand/µL Final   • Eosinophils Absolute 05/05/2024 0.08  0.00 - 0.61 Thousand/µL Final   • Basophils Absolute 05/05/2024 0.04  0.00 - 0.10 Thousands/µL Final   • Sodium 05/05/2024 139  135 - 147 mmol/L Final   • Potassium 05/05/2024 4.6  3.5 - 5.3 mmol/L Final   • Chloride 05/05/2024 103  96 - 108 mmol/L Final    • CO2 05/05/2024 30  21 - 32 mmol/L Final   • ANION GAP 05/05/2024 6  4 - 13 mmol/L Final   • BUN 05/05/2024 20  5 - 25 mg/dL Final   • Creatinine 05/05/2024 1.44 (H)  0.60 - 1.30 mg/dL Final    Standardized to IDMS reference method   • Glucose, Fasting 05/05/2024 102 (H)  65 - 99 mg/dL Final   • Calcium 05/05/2024 9.4  8.4 - 10.2 mg/dL Final   • AST 05/05/2024 23  13 - 39 U/L Final   • ALT 05/05/2024 31  7 - 52 U/L Final    Specimen collection should occur prior to Sulfasalazine administration due to the potential for falsely depressed results.    • Alkaline Phosphatase 05/05/2024 70  34 - 104 U/L Final   • Total Protein 05/05/2024 6.8  6.4 - 8.4 g/dL Final   • Albumin 05/05/2024 4.6  3.5 - 5.0 g/dL Final   • Total Bilirubin 05/05/2024 0.56  0.20 - 1.00 mg/dL Final    Use of this assay is not recommended for patients undergoing treatment with eltrombopag due to the potential for falsely elevated results.  N-acetyl-p-benzoquinone imine (metabolite of Acetaminophen) will generate erroneously low results in samples for patients that have taken an overdose of Acetaminophen.   • eGFR 05/05/2024 58  ml/min/1.73sq m Final   • Hemoglobin A1C 05/05/2024 5.6  Normal 4.0-5.6%; PreDiabetic 5.7-6.4%; Diabetic >=6.5%; Glycemic control for adults with diabetes <7.0% % Final   • EAG 05/05/2024 114  mg/dl Final   • Cholesterol 05/05/2024 181  See Comment mg/dL Final    Cholesterol:         Pediatric <18 Years        Desirable          <170 mg/dL      Borderline High    170-199 mg/dL      High               >=200 mg/dL        Adult >=18 Years            Desirable         <200 mg/dL      Borderline High   200-239 mg/dL      High              >239 mg/dL     • Triglycerides 05/05/2024 121  See Comment mg/dL Final    Triglyceride:     0-9Y            <75mg/dL     10Y-17Y         <90 mg/dL       >=18Y     Normal          <150 mg/dL     Borderline High 150-199 mg/dL     High            200-499 mg/dL        Very High       >499  mg/dL    Specimen collection should occur prior to Metamizole administration due to the potential for falsely depressed results.   • HDL, Direct 05/05/2024 49  >=40 mg/dL Final   • LDL Calculated 05/05/2024 108 (H)  0 - 100 mg/dL Final    LDL Cholesterol:     Optimal           <100 mg/dl     Near Optimal      100-129 mg/dl     Above Optimal       Borderline High 130-159 mg/dl       High            160-189 mg/dl       Very High       >189 mg/dl         This screening LDL is a calculated result.   It does not have the accuracy of the Direct Measured LDL in the monitoring of patients with hyperlipidemia and/or statin therapy.   Direct Measure LDL (FPJ653) must be ordered separately in these patients.   • Non-HDL-Chol (CHOL-HDL) 05/05/2024 132  mg/dl Final   • TSH 3RD GENERATON 05/05/2024 1.556  0.450 - 4.500 uIU/mL Final    Adult TSH (3rd generation) reference range follows the recommended guidelines of the American Thy[employer] Association, January, 2020.   • Color, UA 05/05/2024 Light Yellow   Final   • Clarity, UA 05/05/2024 Clear   Final   • Specific Gravity, UA 05/05/2024 1.020  1.003 - 1.030 Final   • pH, UA 05/05/2024 5.0  4.5, 5.0, 5.5, 6.0, 6.5, 7.0, 7.5, 8.0 Final   • Leukocytes, UA 05/05/2024 Negative  Negative Final   • Nitrite, UA 05/05/2024 Negative  Negative Final   • Protein, UA 05/05/2024 Negative  Negative mg/dl Final   • Glucose, UA 05/05/2024 Negative  Negative mg/dl Final   • Ketones, UA 05/05/2024 Negative  Negative mg/dl Final   • Urobilinogen, UA 05/05/2024 <2.0  <2.0 mg/dl mg/dl Final   • Bilirubin, UA 05/05/2024 Negative  Negative Final   • Occult Blood, UA 05/05/2024 Negative  Negative Final   • Uric Acid 05/05/2024 5.7  3.5 - 8.5 mg/dL Final    Specimen collection should occur prior to Metamizole administration due to the potential for falsely depressed results.   • Creatinine, Ur 05/05/2024 154.6  Reference range not established. mg/dL Final   • Albumin,U,Random 05/05/2024 11.2  <20.0 mg/L Final  "  • Albumin Creat Ratio 05/05/2024 7  0 - 30 mg/g creatinine Final         Sylvester Mora MD        \"This note has been constructed using a voice recognition system.Therefore there may be syntax, spelling, and/or grammatical errors. Please call if you have any questions. \"  "

## 2024-05-21 NOTE — ASSESSMENT & PLAN NOTE
As mentioned in history of present illness patient continues to experience increasing pain in the low back area radiating in the left lower extremity undergoing physical therapy making it worse x-rays of the lumbar spine taken couple of years ago was showing some arthritic changes we are avoiding anti-inflammatories because of the increased creatinine levels.  Patient currently taking Tylenol and heating pad.  Will get an MRI of the lumbar spine after making sure regarding metal in the body patient had surgery in the face with titanium and screws based on the findings make decisions and follow-up.  Meanwhile we will start the patient on Medrol pack

## 2024-05-21 NOTE — ASSESSMENT & PLAN NOTE
Lab reveals creatinine at 1.44 with a GFR of 58 we will continue to monitor with periodic labs avoid nonsteroidal anti-inflammatory medications

## 2024-05-21 NOTE — ASSESSMENT & PLAN NOTE
Lab Results   Component Value Date    HGBA1C 5.6 05/05/2024   A1c came down to 5.6 we will continue with Mounjaro

## 2024-05-21 NOTE — ASSESSMENT & PLAN NOTE
Patient with umbilical hernia examination shows presence of the same no tenderness noted will have him seen by the surgeon for further evaluation and treatment

## 2024-05-21 NOTE — ASSESSMENT & PLAN NOTE
Patient is on Mounjaro injection once a week which is also helping with weight loss as well as controlling his blood sugars will continue today's weight is 282 pounds patient lost about 50 pounds in the last 5 months time

## 2024-05-22 ENCOUNTER — APPOINTMENT (OUTPATIENT)
Dept: PHYSICAL THERAPY | Facility: REHABILITATION | Age: 44
End: 2024-05-22
Payer: COMMERCIAL

## 2024-05-29 ENCOUNTER — APPOINTMENT (OUTPATIENT)
Dept: PHYSICAL THERAPY | Facility: REHABILITATION | Age: 44
End: 2024-05-29
Payer: COMMERCIAL

## 2024-05-30 ENCOUNTER — CONSULT (OUTPATIENT)
Dept: SURGERY | Facility: CLINIC | Age: 44
End: 2024-05-30
Payer: COMMERCIAL

## 2024-05-30 VITALS
HEIGHT: 74 IN | WEIGHT: 276 LBS | DIASTOLIC BLOOD PRESSURE: 86 MMHG | TEMPERATURE: 97.4 F | BODY MASS INDEX: 35.42 KG/M2 | OXYGEN SATURATION: 96 % | HEART RATE: 84 BPM | SYSTOLIC BLOOD PRESSURE: 133 MMHG

## 2024-05-30 DIAGNOSIS — E11.9 TYPE 2 DIABETES MELLITUS WITHOUT COMPLICATION, WITHOUT LONG-TERM CURRENT USE OF INSULIN (HCC): ICD-10-CM

## 2024-05-30 DIAGNOSIS — E66.9 OBESITY (BMI 35.0-39.9 WITHOUT COMORBIDITY): ICD-10-CM

## 2024-05-30 DIAGNOSIS — K42.9 UMBILICAL HERNIA WITHOUT OBSTRUCTION AND WITHOUT GANGRENE: Primary | ICD-10-CM

## 2024-05-30 DIAGNOSIS — F41.9 ANXIETY: ICD-10-CM

## 2024-05-30 PROCEDURE — 99204 OFFICE O/P NEW MOD 45 MIN: CPT | Performed by: SPECIALIST

## 2024-05-30 NOTE — PROGRESS NOTES
Surgical consult and history and Physical Examination - General Surgery   [facility]  Erik White 43 y.o. male MRN: [id number]  : [id number] PCP: Sylvester Mora MD    History of Present Illness   Chief Complaint: I have umbilical hernia for many years    HPI:  Erik White is a 43 y.o. male who presents to office with history of umbilical hernia deformity of umbilicus for many years patient has been on Mounjaro for weight reduction and better control of diabetes and has lost 50 pounds.  This has made his hernia more obvious and slightly bigger  Patient has chronic back pain and was told may be related to umbilical hernia    Patient is here to explore his options for surgery  Denies any nausea vomiting diarrhea constipation  Denies any fever chills or acute abdominal pain  Patient has multiple hernia repair for both groin by Dr. Mendy WEATHERS and Dr. Stan Kjellberg MD    Historical Information   The following portions of the patient's history were reviewed and updated as appropriate  Past Medical History:   Diagnosis Date    Tachycardia      Past Surgical History:   Procedure Laterality Date    EYE SURGERY      HERNIA REPAIR       Social History   Social History     Substance and Sexual Activity   Alcohol Use Yes    Comment: occasional      Social History     Substance and Sexual Activity   Drug Use Never     Social History     Tobacco Use   Smoking Status Former    Types: Cigarettes    Passive exposure: Never   Smokeless Tobacco Never     Family History:   Family History   Problem Relation Age of Onset    Heart attack Father     Heart attack Family        Meds/Allergies   Allergies   Allergen Reactions    Sulfamethoxazole-Trimethoprim        Current Outpatient Medications:     febuxostat (ULORIC) 80 MG TABS, Take 1 tablet (80 mg total) by mouth daily, Disp: 90 tablet, Rfl: 1    Multiple Vitamin (ONE-A-DAY MENS PO), Take by mouth, Disp: , Rfl:     sildenafil (VIAGRA) 100 mg tablet, Take 1  "tablet (100 mg total) by mouth daily as needed for erectile dysfunction, Disp: 10 tablet, Rfl: 0    tirzepatide (Mounjaro) 10 MG/0.5ML, Inject 0.5 mL (10 mg total) under the skin every 7 days, Disp: 2 mL, Rfl: 5    ciclopirox (PENLAC) 8 % solution, Apply topically daily at bedtime (Patient not taking: Reported on 5/30/2024), Disp: 6 mL, Rfl: 1    methylPREDNISolone 4 MG tablet therapy pack, Use as directed on package (Patient not taking: Reported on 5/30/2024), Disp: 21 each, Rfl: 0     REVIEW OF SYSTEMS  Constitutional:  Denies fever or chills   Eyes:  Denies change in visual acuity   HENT:  Denies nasal congestion or sore throat   Respiratory:  Denies cough or shortness of breath   Cardiovascular:  Denies chest pain or edema   GI: Some abdominal discomfort while pushing the hernia in otherwise no abdominal pain, nausea, vomiting, bloody stools or diarrhea   :  Denies dysuria, frequency, difficulty in micturition and nocturia  Musculoskeletal: Patient has acute back pain undergoing physical therapy and was told  That sometimes umbilical hernia can cause back pain  Patient has gouty arthritis on medication  Neurologic:  Denies headache, focal weakness or sensory changes   Endocrine:  Denies polyuria or polydipsia on Mounjaro  Lymphatic:  Denies swollen glands   Psychiatric: History of anxiety not on medication    Objective   Current Vitals:   /86 (BP Location: Left arm, Patient Position: Sitting, Cuff Size: Large)   Pulse 84   Temp (!) 97.4 °F (36.3 °C) (Core)   Ht 6' 2\" (1.88 m)   Wt 125 kg (276 lb)   SpO2 96%   BMI 35.44 kg/m²   Body mass index is 35.44 kg/m².     PHYSICAL EXAMS  General:  Patient is not in acute distress, laying in the bed comfortably, awake, alert responding to commands, BMI 35.44 has recently lost 50 pounds and patient wants to lose another 20 pounds  HEENT:  Both pupils normal-size atraumatic, normocephalic, nonicteric  Neck:  JVP not raised. Trachea central  Respiratory:  normal " Breath sounds clear to auscultation,  Cardiovascular:  S1-S2 normal without any murmur   GI:  Abdomen soft nontender. Liver and spleen normal size, no free fluid, deformed umbilical with fat-containing partially reducible umbilical hernia with no signs of obstruction or strangulation   integument:  No skin rashes or ulceration  Lymphatic:  No cervical lymphadenopathy  Neurologic:  Patient is awake alert, responding to command, well-oriented to time and place and person moving all extremities ambulating well    Visit Diagnosis: . Diagnoses and all orders for this visit:    Umbilical hernia without obstruction and without gangrene  -     Ambulatory referral to General Surgery    Obesity (BMI 35.0-39.9 without comorbidity)    Type 2 diabetes mellitus without complication, without long-term current use of insulin (HCC)    Anxiety       Plan of care was discussed with patient in detail    Pertinent labs reviewed  Pertinent images and available reads personally reviewed  No results found.  Pertinent notes reviewed    Assessment & Plan   Assessment:  Nonobstructing umbilical hernia possible fat-containing  Chronic back pain  Gouty arthritis  Type 2 diabetes mellitus on Mounjaro  Symptomatic   Encounter Diagnoses   Name Primary?    Umbilical hernia without obstruction and without gangrene Yes    Obesity (BMI 35.0-39.9 without comorbidity)     Type 2 diabetes mellitus without complication, without long-term current use of insulin (HCC)     Anxiety     .    Plan:  The risks, benefits, alternatives,and probabilities of success were discussed in detail with no guarantee made as to outcome. All questions were answered to the patient's satisfaction.   Patient understands that some of patient's symptoms or all symptoms may persist even after surgery and wished to proceed with surgery    Advised repair of hernia with mesh    Preoperative prep was explained to the patient  Patient was advised to stop Mounjaro 3 weeks prior to  surgery and 1 week after the surgery    Patient is losing weight and wants to lose more weight and wants to wait for surgery  Agreed to be for a follow-up in 2 months to schedule surgery    Patient was made aware that if hernia gets stuck or develop acute abdominal pain to report to ER for possible emergent surgery    Counseling / Coordination of Care  Expained patient in detailed about coordination of care. A description of the counseling / coordination of care:  I performed an interim history, pertinent images and labs, performed a physical examination to arrive at the plan delineated above with associated thought processes.       Dr Jose Bearden MD MS FRCS FACS  [facility]    Office   Tel.  [phone]  Fax. [phone]

## 2024-06-05 ENCOUNTER — TELEPHONE (OUTPATIENT)
Age: 44
End: 2024-06-05

## 2024-06-05 DIAGNOSIS — M54.42 ACUTE LEFT-SIDED LOW BACK PAIN WITH LEFT-SIDED SCIATICA: ICD-10-CM

## 2024-06-05 RX ORDER — METHYLPREDNISOLONE 4 MG/1
TABLET ORAL
Qty: 21 EACH | Refills: 0 | Status: SHIPPED | OUTPATIENT
Start: 2024-06-05

## 2024-06-05 NOTE — TELEPHONE ENCOUNTER
Reason for call:   [x] Refill   [] Prior Auth  [x] Other: Pt called and requesting more prednisone, also states he wants to speak to slava. Please call pt at [phone]      [x] PCP/Provider - Sylvester Mora MD  PCP - General  [] Specialty/Provider -     Medication: Prednisone

## 2024-06-07 ENCOUNTER — HOSPITAL ENCOUNTER (OUTPATIENT)
Dept: MRI IMAGING | Facility: HOSPITAL | Age: 44
Discharge: HOME | End: 2024-06-07
Payer: COMMERCIAL

## 2024-06-07 DIAGNOSIS — M54.42 ACUTE LEFT-SIDED LOW BACK PAIN WITH LEFT-SIDED SCIATICA: ICD-10-CM

## 2024-06-07 PROCEDURE — 72148 MRI LUMBAR SPINE W/O DYE: CPT

## 2024-06-10 DIAGNOSIS — E11.9 TYPE 2 DIABETES MELLITUS WITHOUT COMPLICATION, WITHOUT LONG-TERM CURRENT USE OF INSULIN (HCC): ICD-10-CM

## 2024-06-19 ENCOUNTER — OFFICE VISIT (OUTPATIENT)
Dept: FAMILY MEDICINE CLINIC | Facility: CLINIC | Age: 44
End: 2024-06-19
Payer: COMMERCIAL

## 2024-06-19 VITALS
WEIGHT: 270.4 LBS | SYSTOLIC BLOOD PRESSURE: 118 MMHG | OXYGEN SATURATION: 96 % | HEIGHT: 74 IN | BODY MASS INDEX: 34.7 KG/M2 | DIASTOLIC BLOOD PRESSURE: 76 MMHG | HEART RATE: 102 BPM

## 2024-06-19 DIAGNOSIS — R73.03 PREDIABETES: ICD-10-CM

## 2024-06-19 DIAGNOSIS — M54.42 ACUTE LEFT-SIDED LOW BACK PAIN WITH LEFT-SIDED SCIATICA: Primary | ICD-10-CM

## 2024-06-19 DIAGNOSIS — E11.9 TYPE 2 DIABETES MELLITUS WITHOUT COMPLICATION, WITHOUT LONG-TERM CURRENT USE OF INSULIN (HCC): ICD-10-CM

## 2024-06-19 DIAGNOSIS — R74.8 ELEVATED LIVER ENZYMES: ICD-10-CM

## 2024-06-19 DIAGNOSIS — M1A.09X0 CHRONIC GOUT OF MULTIPLE SITES, UNSPECIFIED CAUSE: ICD-10-CM

## 2024-06-19 DIAGNOSIS — K42.9 UMBILICAL HERNIA WITHOUT OBSTRUCTION AND WITHOUT GANGRENE: ICD-10-CM

## 2024-06-19 DIAGNOSIS — B35.1 FUNGAL INFECTION OF NAIL: ICD-10-CM

## 2024-06-19 DIAGNOSIS — N52.9 ERECTILE DYSFUNCTION, UNSPECIFIED ERECTILE DYSFUNCTION TYPE: ICD-10-CM

## 2024-06-19 DIAGNOSIS — E66.9 OBESITY (BMI 35.0-39.9 WITHOUT COMORBIDITY): ICD-10-CM

## 2024-06-19 DIAGNOSIS — N28.9 RENAL INSUFFICIENCY: ICD-10-CM

## 2024-06-19 PROCEDURE — 99214 OFFICE O/P EST MOD 30 MIN: CPT | Performed by: INTERNAL MEDICINE

## 2024-06-19 RX ORDER — METHYLPREDNISOLONE 4 MG/1
TABLET ORAL
Qty: 21 EACH | Refills: 0 | Status: SHIPPED | OUTPATIENT
Start: 2024-06-19

## 2024-06-19 RX ORDER — SILDENAFIL 100 MG/1
100 TABLET, FILM COATED ORAL DAILY PRN
Qty: 10 TABLET | Refills: 0 | Status: SHIPPED | OUTPATIENT
Start: 2024-06-19

## 2024-06-19 NOTE — PROGRESS NOTES
Office Visit Note  24     Erik White 43 y.o. male MRN: [id number]  : 1980    Assessment:     1. Acute left-sided low back pain with left-sided sciatica  Assessment & Plan:  As mentioned in the history of present illness patient with acute low back pain radiating on the left lower extremity with tingling numbness sensation MRI coming back abnormal will refer the patient to the spine and pain physician for further evaluation meanwhile recommend patient to hold off any kind of therapy until we have a clear picture.  Orders:  -     Ambulatory referral to Spine & Pain Management; Future  -     methylPREDNISolone 4 MG tablet therapy pack; Use as directed on package  2. Chronic gout of multiple sites, unspecified cause  Assessment & Plan:  No episodes of gout in the recent past currently taking Uloric 80 mg daily continue  3. Renal insufficiency  Assessment & Plan:  Creatinine 1.44 with a GFR of 58 continue with the current medications we will follow-up with repeat BMP  4. Type 2 diabetes mellitus without complication, without long-term current use of insulin (HCC)  Assessment & Plan:    Lab Results   Component Value Date    HGBA1C 5.6 2024   Last hemoglobin A1c is at 5.6 continue with Mounjaro  Orders:  -     Comprehensive metabolic panel; Future  5. Obesity (BMI 35.0-39.9 without comorbidity)  Assessment & Plan:  Currently patient is on Mounjaro once a week in total he has lost about 50 pounds but with current pain in the low back area patient is not able to do much activity also and is has been also on Medrol pack which can lead to some weight  6. Elevated liver enzymes  -     Comprehensive metabolic panel; Future  7. Fungal infection of nail  8. Umbilical hernia without obstruction and without gangrene  Assessment & Plan:  Patient with umbilical hernia seen by the surgeon no intervention at present time until he is off the Mounjaro because has been gradually losing weight will monitor for  now.  9. Erectile dysfunction, unspecified erectile dysfunction type  -     sildenafil (VIAGRA) 100 mg tablet; Take 1 tablet (100 mg total) by mouth daily as needed for erectile dysfunction  10. Prediabetes             Discussion Summary and Plan:  Today's care plan and medications were reviewed with patient in detail and all their questions answered to their satisfaction.    Chief Complaint   Patient presents with    Follow-up      Subjective:  Patient is coming for evaluation regarding symptoms of pain in the low back area radiating down the left lower extremity.  Patient received physical therapy but patient appears to be a pain few hours after finishing the therapy.  He had an MRI done of the lumbosacral spine which has shown abnormalities including disc protrusions.    Patient had received a course of Medrol pack to which she responds very well especially when he is on 5 tablets and has a number of tablets to reduce the pain starts to come back.  He also gets numbness in the left lower extremity.  Medication reviewed labs reviewed patient is on Mounjaro and has lost about 50 pounds since we started that medication.    Patient was seen by the surgeon regarding umbilical hernia at present him he did not want any intervention done while he is taking Mounjaro and losing weight.        The following portions of the patient's history were reviewed and updated as appropriate: allergies, current medications, past family history, past medical history, past social history, past surgical history and problem list.    Review of Systems   Constitutional:  Negative for chills and fever.   HENT:  Negative for ear pain and sore throat.    Eyes:  Negative for pain and visual disturbance.   Respiratory:  Negative for cough and shortness of breath.    Cardiovascular:  Negative for chest pain and palpitations.   Gastrointestinal:  Negative for abdominal pain and vomiting.   Genitourinary:  Negative for dysuria and hematuria.    Musculoskeletal:  Positive for arthralgias and back pain.   Skin:  Negative for color change and rash.   Neurological:  Negative for seizures and syncope.   All other systems reviewed and are negative.        Historical Information   Patient Active Problem List   Diagnosis    Atypical chest pain    Anxiety    Tachycardia    Chronic gout of multiple sites    Acute low back pain    Renal insufficiency    Fungal infection of nail    Obesity (BMI 35.0-39.9 without comorbidity)    Type 2 diabetes mellitus without complication, without long-term current use of insulin (HCC)    Elevated liver enzymes    Upper respiratory infection    Umbilical hernia without obstruction and without gangrene     Past Medical History:   Diagnosis Date    Tachycardia      Past Surgical History:   Procedure Laterality Date    EYE SURGERY      HERNIA REPAIR       Social History     Substance and Sexual Activity   Alcohol Use Yes    Comment: occasional      Social History     Substance and Sexual Activity   Drug Use Never     Social History     Tobacco Use   Smoking Status Former    Types: Cigarettes    Passive exposure: Never   Smokeless Tobacco Never     Family History   Problem Relation Age of Onset    Heart attack Father     Heart attack Family      Health Maintenance Due   Topic    Pneumococcal Vaccine: Pediatrics (0 to 5 Years) and At-Risk Patients (6 to 64 Years) (1 of 2 - PCV)    HIV Screening     Annual Physical     DTaP,Tdap,and Td Vaccines (1 - Tdap)    Colorectal Cancer Screening     COVID-19 Vaccine (7 - 2023-24 season)    PT PLAN OF CARE     Influenza Vaccine (Season Ended)      Meds/Allergies       Current Outpatient Medications:     febuxostat (ULORIC) 80 MG TABS, Take 1 tablet (80 mg total) by mouth daily, Disp: 90 tablet, Rfl: 1    methylPREDNISolone 4 MG tablet therapy pack, Use as directed on package, Disp: 21 each, Rfl: 0    Multiple Vitamin (ONE-A-DAY MENS PO), Take by mouth, Disp: , Rfl:     sildenafil (VIAGRA) 100 mg  "tablet, Take 1 tablet (100 mg total) by mouth daily as needed for erectile dysfunction, Disp: 10 tablet, Rfl: 0    tirzepatide (Mounjaro) 10 MG/0.5ML, Inject 0.5 mL (10 mg total) under the skin every 7 days, Disp: 2 mL, Rfl: 5    ciclopirox (PENLAC) 8 % solution, Apply topically daily at bedtime (Patient not taking: Reported on 5/30/2024), Disp: 6 mL, Rfl: 1      Objective:    Vitals:   /76 (BP Location: Right arm, Patient Position: Sitting, Cuff Size: Large)   Pulse 102   Ht 6' 2\" (1.88 m)   Wt 123 kg (270 lb 6.4 oz)   SpO2 96%   BMI 34.72 kg/m²   Body mass index is 34.72 kg/m².  Vitals:    06/19/24 1508   Weight: 123 kg (270 lb 6.4 oz)       Physical Exam  Vitals and nursing note reviewed.   Constitutional:       Appearance: Normal appearance.   Cardiovascular:      Rate and Rhythm: Normal rate and regular rhythm.      Heart sounds: Normal heart sounds.   Pulmonary:      Effort: Pulmonary effort is normal.      Breath sounds: Normal breath sounds.   Abdominal:      Palpations: Abdomen is soft.   Musculoskeletal:      Right lower leg: No edema.      Left lower leg: No edema.      Comments: Movements of the lumbar spine causing discomfort and pain SLR about 30 to 40 degrees both sides   Skin:     General: Skin is warm and dry.      Capillary Refill: Capillary refill takes less than 2 seconds.   Neurological:      Mental Status: He is alert and oriented to person, place, and time.   Psychiatric:         Mood and Affect: Mood normal.         Behavior: Behavior normal.         Lab Review   Appointment on 05/05/2024   Component Date Value Ref Range Status    WBC 05/05/2024 8.60  4.31 - 10.16 Thousand/uL Final    RBC 05/05/2024 4.89  3.88 - 5.62 Million/uL Final    Hemoglobin 05/05/2024 15.0  12.0 - 17.0 g/dL Final    Hematocrit 05/05/2024 44.6  36.5 - 49.3 % Final    MCV 05/05/2024 91  82 - 98 fL Final    MCH 05/05/2024 30.7  26.8 - 34.3 pg Final    MCHC 05/05/2024 33.6  31.4 - 37.4 g/dL Final    RDW " 05/05/2024 12.2  11.6 - 15.1 % Final    MPV 05/05/2024 11.6  8.9 - 12.7 fL Final    Platelets 05/05/2024 187  149 - 390 Thousands/uL Final    nRBC 05/05/2024 0  /100 WBCs Final    Segmented % 05/05/2024 53  43 - 75 % Final    Immature Grans % 05/05/2024 1  0 - 2 % Final    Lymphocytes % 05/05/2024 34  14 - 44 % Final    Monocytes % 05/05/2024 10  4 - 12 % Final    Eosinophils Relative 05/05/2024 1  0 - 6 % Final    Basophils Relative 05/05/2024 1  0 - 1 % Final    Absolute Neutrophils 05/05/2024 4.64  1.85 - 7.62 Thousands/µL Final    Absolute Immature Grans 05/05/2024 0.06  0.00 - 0.20 Thousand/uL Final    Absolute Lymphocytes 05/05/2024 2.96  0.60 - 4.47 Thousands/µL Final    Absolute Monocytes 05/05/2024 0.82  0.17 - 1.22 Thousand/µL Final    Eosinophils Absolute 05/05/2024 0.08  0.00 - 0.61 Thousand/µL Final    Basophils Absolute 05/05/2024 0.04  0.00 - 0.10 Thousands/µL Final    Sodium 05/05/2024 139  135 - 147 mmol/L Final    Potassium 05/05/2024 4.6  3.5 - 5.3 mmol/L Final    Chloride 05/05/2024 103  96 - 108 mmol/L Final    CO2 05/05/2024 30  21 - 32 mmol/L Final    ANION GAP 05/05/2024 6  4 - 13 mmol/L Final    BUN 05/05/2024 20  5 - 25 mg/dL Final    Creatinine 05/05/2024 1.44 (H)  0.60 - 1.30 mg/dL Final    Standardized to IDMS reference method    Glucose, Fasting 05/05/2024 102 (H)  65 - 99 mg/dL Final    Calcium 05/05/2024 9.4  8.4 - 10.2 mg/dL Final    AST 05/05/2024 23  13 - 39 U/L Final    ALT 05/05/2024 31  7 - 52 U/L Final    Specimen collection should occur prior to Sulfasalazine administration due to the potential for falsely depressed results.     Alkaline Phosphatase 05/05/2024 70  34 - 104 U/L Final    Total Protein 05/05/2024 6.8  6.4 - 8.4 g/dL Final    Albumin 05/05/2024 4.6  3.5 - 5.0 g/dL Final    Total Bilirubin 05/05/2024 0.56  0.20 - 1.00 mg/dL Final    Use of this assay is not recommended for patients undergoing treatment with eltrombopag due to the potential for falsely elevated  results.  N-acetyl-p-benzoquinone imine (metabolite of Acetaminophen) will generate erroneously low results in samples for patients that have taken an overdose of Acetaminophen.    eGFR 05/05/2024 58  ml/min/1.73sq m Final    Hemoglobin A1C 05/05/2024 5.6  Normal 4.0-5.6%; PreDiabetic 5.7-6.4%; Diabetic >=6.5%; Glycemic control for adults with diabetes <7.0% % Final    EAG 05/05/2024 114  mg/dl Final    Cholesterol 05/05/2024 181  See Comment mg/dL Final    Cholesterol:         Pediatric <18 Years        Desirable          <170 mg/dL      Borderline High    170-199 mg/dL      High               >=200 mg/dL        Adult >=18 Years            Desirable         <200 mg/dL      Borderline High   200-239 mg/dL      High              >239 mg/dL      Triglycerides 05/05/2024 121  See Comment mg/dL Final    Triglyceride:     0-9Y            <75mg/dL     10Y-17Y         <90 mg/dL       >=18Y     Normal          <150 mg/dL     Borderline High 150-199 mg/dL     High            200-499 mg/dL        Very High       >499 mg/dL    Specimen collection should occur prior to Metamizole administration due to the potential for falsely depressed results.    HDL, Direct 05/05/2024 49  >=40 mg/dL Final    LDL Calculated 05/05/2024 108 (H)  0 - 100 mg/dL Final    LDL Cholesterol:     Optimal           <100 mg/dl     Near Optimal      100-129 mg/dl     Above Optimal       Borderline High 130-159 mg/dl       High            160-189 mg/dl       Very High       >189 mg/dl         This screening LDL is a calculated result.   It does not have the accuracy of the Direct Measured LDL in the monitoring of patients with hyperlipidemia and/or statin therapy.   Direct Measure LDL ([id number]) must be ordered separately in these patients.    Non-HDL-Chol (CHOL-HDL) 05/05/2024 132  mg/dl Final    TSH 3RD GENERATON 05/05/2024 1.556  0.450 - 4.500 uIU/mL Final    Adult TSH (3rd generation) reference range follows the recommended guidelines of the American  "Thyroid Association, January, 2020.    Color, UA 05/05/2024 Light Yellow   Final    Clarity, UA 05/05/2024 Clear   Final    Specific Gravity, UA 05/05/2024 1.020  1.003 - 1.030 Final    pH, UA 05/05/2024 5.0  4.5, 5.0, 5.5, 6.0, 6.5, 7.0, 7.5, 8.0 Final    Leukocytes, UA 05/05/2024 Negative  Negative Final    Nitrite, UA 05/05/2024 Negative  Negative Final    Protein, UA 05/05/2024 Negative  Negative mg/dl Final    Glucose, UA 05/05/2024 Negative  Negative mg/dl Final    Ketones, UA 05/05/2024 Negative  Negative mg/dl Final    Urobilinogen, UA 05/05/2024 <2.0  <2.0 mg/dl mg/dl Final    Bilirubin, UA 05/05/2024 Negative  Negative Final    Occult Blood, UA 05/05/2024 Negative  Negative Final    Uric Acid 05/05/2024 5.7  3.5 - 8.5 mg/dL Final    Specimen collection should occur prior to Metamizole administration due to the potential for falsely depressed results.    Creatinine, Ur 05/05/2024 154.6  Reference range not established. mg/dL Final    Albumin,U,Random 05/05/2024 11.2  <20.0 mg/L Final    Albumin Creat Ratio 05/05/2024 7  0 - 30 mg/g creatinine Final         Sylvester Mora MD        \"This note has been constructed using a voice recognition system.Therefore there may be syntax, spelling, and/or grammatical errors. Please call if you have any questions. \"  "

## 2024-06-19 NOTE — ASSESSMENT & PLAN NOTE
Lab Results   Component Value Date    HGBA1C 5.6 05/05/2024   Last hemoglobin A1c is at 5.6 continue with Mounjaro

## 2024-06-19 NOTE — ASSESSMENT & PLAN NOTE
As mentioned in the history of present illness patient with acute low back pain radiating on the left lower extremity with tingling numbness sensation MRI coming back abnormal will refer the patient to the spine and pain physician for further evaluation meanwhile recommend patient to hold off any kind of therapy until we have a clear picture.

## 2024-06-19 NOTE — ASSESSMENT & PLAN NOTE
Currently patient is on Mounjaro once a week in total he has lost about 50 pounds but with current pain in the low back area patient is not able to do much activity also and is has been also on Medrol pack which can lead to some weight

## 2024-06-19 NOTE — ASSESSMENT & PLAN NOTE
Creatinine 1.44 with a GFR of 58 continue with the current medications we will follow-up with repeat BMP

## 2024-06-19 NOTE — ASSESSMENT & PLAN NOTE
Patient with umbilical hernia seen by the surgeon no intervention at present time until he is off the Mounjaro because has been gradually losing weight will monitor for now.

## 2024-06-27 ENCOUNTER — CONSULT (OUTPATIENT)
Dept: PAIN MEDICINE | Facility: CLINIC | Age: 44
End: 2024-06-27
Payer: COMMERCIAL

## 2024-06-27 VITALS
HEIGHT: 74 IN | SYSTOLIC BLOOD PRESSURE: 125 MMHG | WEIGHT: 270 LBS | HEART RATE: 66 BPM | BODY MASS INDEX: 34.65 KG/M2 | DIASTOLIC BLOOD PRESSURE: 79 MMHG

## 2024-06-27 DIAGNOSIS — M54.42 ACUTE LEFT-SIDED LOW BACK PAIN WITH LEFT-SIDED SCIATICA: ICD-10-CM

## 2024-06-27 DIAGNOSIS — M54.16 LUMBAR RADICULOPATHY: Primary | ICD-10-CM

## 2024-06-27 DIAGNOSIS — M51.9 ANNULAR TEAR OF INTERVERTEBRAL DISC: ICD-10-CM

## 2024-06-27 PROCEDURE — 99244 OFF/OP CNSLTJ NEW/EST MOD 40: CPT | Performed by: PAIN MEDICINE

## 2024-06-27 RX ORDER — METHOCARBAMOL 500 MG/1
500 TABLET, FILM COATED ORAL 3 TIMES DAILY
Qty: 90 TABLET | Refills: 1 | Status: SHIPPED | OUTPATIENT
Start: 2024-06-27

## 2024-06-27 NOTE — PROGRESS NOTES
Assessment  1. Lumbar radiculopathy  -     [place] spine and pain procedure; Future; Expected date: 06/27/2024  -     diclofenac sodium (VOLTAREN) 50 mg EC tablet; Take 1 tablet (50 mg total) by mouth 2 (two) times a day  -     methocarbamol (ROBAXIN) 500 mg tablet; Take 1 tablet (500 mg total) by mouth 3 (three) times a day  2. Acute left-sided low back pain with left-sided sciatica  -     Ambulatory referral to Spine & Pain Management  3. Annular tear of intervertebral disc        Erik is a pleasant 43-year-old male with history of low back pain left leg pain in the distribution of L4 and L5 pain symptoms are present in the bilateral calf mostly on the left side the pain symptoms ongoing for past 4 years increasing in severity in the past 3 months MRI based on my review shows an annular tear at L4-5 with foraminal encroachment as well as degenerative disease at L5-S1 the symptoms appear to be in the distribution of L4 and L5.  He has failed conservative therapy including 6 weeks of PT and last 6 months we will recommend at this time left L4-5 5 S1 transforaminal epidural steroid injection    The risks of the procedure were discussed in detail.  These risks include infection, increased pain, paralysis, bleeding.  Patient understands the risks and is willing to pursue with the procedure    For pain medication will start diclofenac 50 mg twice daily and methocarbamol 5 mg 3 times daily discussed risk benefits with the medications.        My impressions and treatment recommendations were discussed in detail with the patient who verbalized understanding and had no further questions.  Discharge instructions were provided. I personally saw and examined the patient and I agree with the above discussed plan of care.    Plan      New Medications Ordered This Visit   Medications   • diclofenac sodium (VOLTAREN) 50 mg EC tablet     Sig: Take 1 tablet (50 mg total) by mouth 2 (two) times a day     Dispense:  60 tablet      Refill:  1   • methocarbamol (ROBAXIN) 500 mg tablet     Sig: Take 1 tablet (500 mg total) by mouth 3 (three) times a day     Dispense:  90 tablet     Refill:  1         Orders Placed This Encounter   Procedures   • [place] spine and pain procedure     Standing Status:   Future     Standing Expiration Date:   6/27/2028     Order Specific Question:   Reason for Exam:     Answer:   left L4-5, L5-s1 transforaminal epidural steroid injection     Order Specific Question:   Anticoagulant hold needed?     Answer:   none         History of Present Illness    Erik White is a 43 y.o. male with relevant PMH of DM II    Presented to [facility] spine and pain for chief complaint of low back pain left side rating into the left leg in the L4-5 distribution occasionally goes down to his left calf and back of the leg.  The symptoms include burning aching throbbing gets back pain when he stands for long period of time improved with sitting and lying flat quality pain is moderate severe intensity alleviated by lying flat he is done physical therapy 6 weeks and last 6 months without benefit acting exacerbated his pain this pain is currently impacting quality of his life    No allergies to contrast dyes are on blood thinners   ed and/or updated the patient's past medical history, past surgical history, family history, social history, current medications, allergies, and vital signs today.     Review of Systems   Musculoskeletal:  Positive for arthralgias, back pain and myalgias.   All other systems reviewed and are negative.      Patient Active Problem List   Diagnosis   • Atypical chest pain   • Anxiety   • Tachycardia   • Chronic gout of multiple sites   • Acute low back pain   • Renal insufficiency   • Fungal infection of nail   • Obesity (BMI 35.0-39.9 without comorbidity)   • Type 2 diabetes mellitus without complication, without long-term current use of insulin (HCC)   • Elevated liver enzymes   • Upper respiratory infection   •  "Umbilical hernia without obstruction and without gangrene       Past Medical History:   Diagnosis Date   • Tachycardia        Past Surgical History:   Procedure Laterality Date   • EYE SURGERY     • HERNIA REPAIR         Family History   Problem Relation Age of Onset   • Heart attack Father    • Heart attack Family        Social History     Occupational History   • Not on file   Tobacco Use   • Smoking status: Former     Types: Cigarettes     Passive exposure: Never   • Smokeless tobacco: Never   Vaping Use   • Vaping status: Never Used   Substance and Sexual Activity   • Alcohol use: Yes     Comment: occasional    • Drug use: Never   • Sexual activity: Not on file       Current Outpatient Medications on File Prior to Visit   Medication Sig   • febuxostat (ULORIC) 80 MG TABS Take 1 tablet (80 mg total) by mouth daily   • methylPREDNISolone 4 MG tablet therapy pack Use as directed on package   • Multiple Vitamin (ONE-A-DAY MENS PO) Take by mouth   • sildenafil (VIAGRA) 100 mg tablet Take 1 tablet (100 mg total) by mouth daily as needed for erectile dysfunction   • tirzepatide (Mounjaro) 10 MG/0.5ML Inject 0.5 mL (10 mg total) under the skin every 7 days   • ciclopirox (PENLAC) 8 % solution Apply topically daily at bedtime (Patient not taking: Reported on 5/30/2024)     No current facility-administered medications on file prior to visit.       Allergies   Allergen Reactions   • Sulfamethoxazole-Trimethoprim          Physical Exam    /79 (BP Location: Left arm, Patient Position: Sitting, Cuff Size: Standard)   Pulse 66   Ht 6' 2\" (1.88 m)   Wt 122 kg (270 lb)   BMI 34.67 kg/m²         MSK:      Lumbar Spine:  No masses or atrophy,    Range of motion - Decreased extension/flexion  Palpation - Tenderness to palpation in the lumbar parapsinals   PSIS tenderness no  Bo's/DONTA no  Gaenslen's no  SLR positive left       Strength Right Left   Hip flexion L1,2 5 5   Knee extension L3 5 5   Ankle dorsiflexion L4 " 5 5   Great toe extension L5 5 5   Ankle Plantarflexion S1 5 5       Sensory Exam:  intact to light touch bilateral LE       Reflexes:     Right Left   Biceps 2+ 2+   Triceps 2+ 2+   Brachioradialis 2+ 2+   Patellar 2+ 2+   Achilles 2+ 2+   Babinski neg neg        Gait normal                  Imaging 6/24    MRI L spine  IMPRESSION:  Spondylotic changes of the lumbar spine most pronounced at the L5-S1 level with a right paracentral disc protrusion resulting in posterior displacement of the right S1 nerve root through the subarticular recess and concerning for impingement.   Additionally, bilateral facet arthrosis results in mild to moderate right and moderate left foraminal encroachment. Correlate clinically for left greater than right L5 radiculopathy.

## 2024-07-03 DIAGNOSIS — E11.9 TYPE 2 DIABETES MELLITUS WITHOUT COMPLICATION, WITHOUT LONG-TERM CURRENT USE OF INSULIN (HCC): ICD-10-CM

## 2024-07-22 ENCOUNTER — HOSPITAL ENCOUNTER (OUTPATIENT)
Dept: RADIOLOGY | Facility: HOSPITAL | Age: 44
Discharge: HOME | End: 2024-07-22
Attending: PAIN MEDICINE
Payer: COMMERCIAL

## 2024-07-22 VITALS
HEART RATE: 86 BPM | SYSTOLIC BLOOD PRESSURE: 129 MMHG | OXYGEN SATURATION: 98 % | RESPIRATION RATE: 17 BRPM | TEMPERATURE: 97.2 F | DIASTOLIC BLOOD PRESSURE: 91 MMHG

## 2024-07-22 DIAGNOSIS — M54.16 LUMBAR RADICULOPATHY: ICD-10-CM

## 2024-07-22 RX ORDER — LIDOCAINE HYDROCHLORIDE 10 MG/ML
10 INJECTION, SOLUTION EPIDURAL; INFILTRATION; INTRACAUDAL; PERINEURAL ONCE
Status: COMPLETED | OUTPATIENT
Start: 2024-07-22 | End: 2024-07-22

## 2024-07-22 RX ORDER — BUPIVACAINE HCL/PF 2.5 MG/ML
2 VIAL (ML) INJECTION ONCE
Status: COMPLETED | OUTPATIENT
Start: 2024-07-22 | End: 2024-07-22

## 2024-07-22 RX ADMIN — IOHEXOL 1 ML: 300 INJECTION, SOLUTION INTRAVENOUS at 08:43

## 2024-07-22 RX ADMIN — LIDOCAINE HYDROCHLORIDE 8 ML: 10 INJECTION, SOLUTION EPIDURAL; INFILTRATION; INTRACAUDAL; PERINEURAL at 08:41

## 2024-07-22 RX ADMIN — BUPIVACAINE HYDROCHLORIDE 2 ML: 2.5 INJECTION, SOLUTION EPIDURAL; INFILTRATION; INTRACAUDAL at 08:43

## 2024-07-22 RX ADMIN — BETAMETHASONE SODIUM PHOSPHATE AND BETAMETHASONE ACETATE 18 MG: 3; 3 INJECTION, SUSPENSION INTRA-ARTICULAR; INTRALESIONAL; INTRAMUSCULAR at 08:43

## 2024-07-22 NOTE — DISCHARGE INSTR - LAB
Epidural Steroid Injection   WHAT YOU NEED TO KNOW:   An epidural steroid injection (LAFONSO) is a procedure to inject steroid medicine into the epidural space. The epidural space is between your spinal cord and vertebrae. Steroids reduce inflammation and fluid buildup in your spine that may be causing pain. You may be given pain medicine along with the steroids.          ACTIVITY  Do not drive or operate machinery today.  No strenuous activity today - bending, lifting, etc.  You may resume normal activites starting tomorrow - start slowly and as tolerated.  You may shower today, but no tub baths or hot tubs.  You may have numbness for several hours from the local anesthetic. Please use caution and common sense, especially with weight-bearing activities.    CARE OF THE INJECTION SITE  If you have soreness or pain, apply ice to the area today (20 minutes on/20 minutes off).  Starting tomorrow, you may use warm, moist heat or ice if needed.  You may have an increase or change in your discomfort for 36-48 hours after your treatment.  Apply ice and continue with any pain medication you have been prescribed.  Notify the [facility] if you have any of the following: redness, drainage, swelling, headache, stiff neck or fever above 100°F.    SPECIAL INSTRUCTIONS  Our office will contact you in approximately 14 days for a progress report.    MEDICATIONS  Continue to take all routine medications.  Our office may have instructed you to hold some medications.    As no general anesthesia was used in today's procedure, you should not experience any side effects related to anesthesia.     If you are diabetic, the steroids used in today's injection may temporarily increase your blood sugar levels after the first few days after your injection. Please keep a close eye on your sugars and alert the doctor who manages your diabetes if your sugars are significantly high from your baseline or you are symptomatic.     If you have a  problem specifically related to your procedure, please call our office at [phone].  Problems not related to your procedure should be directed to your primary care physician.

## 2024-07-30 ENCOUNTER — TELEPHONE (OUTPATIENT)
Dept: SURGERY | Facility: CLINIC | Age: 44
End: 2024-07-30

## 2024-07-30 NOTE — TELEPHONE ENCOUNTER
Left message for patient to call office, has appointment at 4:15 PM on 8/1/2024.  To see if patient can come at 3:30 or 4:00 PM

## 2024-08-01 ENCOUNTER — TELEPHONE (OUTPATIENT)
Age: 44
End: 2024-08-01

## 2024-08-01 DIAGNOSIS — E11.9 TYPE 2 DIABETES MELLITUS WITHOUT COMPLICATION, WITHOUT LONG-TERM CURRENT USE OF INSULIN (HCC): ICD-10-CM

## 2024-08-02 DIAGNOSIS — M54.16 LUMBAR RADICULOPATHY: ICD-10-CM

## 2024-08-02 NOTE — TELEPHONE ENCOUNTER
"Refuse med refill as it was ordered on 6/27 with 1 refill.    Unable to close task due to message \"You must address all unsigned medications to complete this message.\"  "

## 2024-08-09 ENCOUNTER — OFFICE VISIT (OUTPATIENT)
Dept: PAIN MEDICINE | Facility: CLINIC | Age: 44
End: 2024-08-09
Payer: COMMERCIAL

## 2024-08-09 VITALS
WEIGHT: 270 LBS | SYSTOLIC BLOOD PRESSURE: 124 MMHG | HEIGHT: 74 IN | DIASTOLIC BLOOD PRESSURE: 92 MMHG | BODY MASS INDEX: 34.65 KG/M2

## 2024-08-09 DIAGNOSIS — M54.16 LUMBAR RADICULOPATHY: ICD-10-CM

## 2024-08-09 DIAGNOSIS — M51.26 LUMBAR DISC HERNIATION: ICD-10-CM

## 2024-08-09 DIAGNOSIS — M47.816 LUMBAR SPONDYLOSIS: Primary | ICD-10-CM

## 2024-08-09 PROCEDURE — 99214 OFFICE O/P EST MOD 30 MIN: CPT | Performed by: PAIN MEDICINE

## 2024-08-09 RX ORDER — GABAPENTIN 300 MG/1
CAPSULE ORAL
Qty: 90 CAPSULE | Refills: 0 | Status: SHIPPED | OUTPATIENT
Start: 2024-08-09

## 2024-08-09 NOTE — PROGRESS NOTES
Assessment  1. Lumbar spondylosis  -     gabapentin (NEURONTIN) 300 mg capsule; Take 1 tablet at bedtime for 3 days, then 1 tablet twice daily for 3 days, then 1 tablet 3 times daily  2. Lumbar disc herniation  -     gabapentin (NEURONTIN) 300 mg capsule; Take 1 tablet at bedtime for 3 days, then 1 tablet twice daily for 3 days, then 1 tablet 3 times daily  3. Lumbar radiculopathy  -     gabapentin (NEURONTIN) 300 mg capsule; Take 1 tablet at bedtime for 3 days, then 1 tablet twice daily for 3 days, then 1 tablet 3 times daily  -     [place] spine and pain procedure; Future; Expected date: 08/09/2024      Is a pleasant 43-year-old male with history of low back and left leg pain in the distribution of L3-4 and 4 5 he status post left L3 445 transforaminal with excellent relief about 40% reduction in pain symptoms still having pain in the left lower back rating to the left L4 distribution.  Due to his continuing pain symptoms and lack of response with conservative management will recommend repeat epidural L4-5 interlaminar epidurals or injection.  At this point we will start gabapentin 100 mg 3 times daily continue with diclofenac 50 mg twice daily start topical lidocaine 4% patch to the skin where his pain symptoms are located.    The risks of the procedure were discussed in detail.  These risks include infection, increased pain, paralysis, bleeding.  Patient understands the risks and is willing to pursue with the procedure      The risks of the procedure were discussed in detail.  These risks include infection, increased pain, paralysis, bleeding.  Patient understands the risks and is willing to pursue with the procedure      My impressions and treatment recommendations were discussed in detail with the patient who verbalized understanding and had no further questions.  Discharge instructions were provided. I personally saw and examined the patient and I agree with the above discussed plan of care.    Plan      New  Medications Ordered This Visit   Medications   • gabapentin (NEURONTIN) 300 mg capsule     Sig: Take 1 tablet at bedtime for 3 days, then 1 tablet twice daily for 3 days, then 1 tablet 3 times daily     Dispense:  90 capsule     Refill:  0           Orders Placed This Encounter   Procedures   • FL spine and pain procedure     Standing Status:   Future     Standing Expiration Date:   8/9/2028     Order Specific Question:   Reason for Exam:     Answer:   L4-5 interlaminar epidural left paramediuan     Order Specific Question:   Anticoagulant hold needed?     Answer:   none           History of Present Illness  Follow-up 8/9/2024    Erik is status post left L4-5 L3-4 transforaminal and reports about 40% improvement in his low back and left leg pain symptoms and the less radicular pain less severity of pain still having pain in the lower back at the L3 445 level with radiation into the left buttocks and down to the anterior thigh.    He is not getting benefit methocarbamol 500 mg 3 times daily as he gets relief with diclofenac 50 mg twice a day.    Initial consultation  Erik White is a 43 y.o. male with relevant PMH of DM II    Presented to [facility] spine and pain for chief complaint of low back pain left side rating into the left leg in the L4-5 distribution occasionally goes down to his left calf and back of the leg.  The symptoms include burning aching throbbing gets back pain when he stands for long period of time improved with sitting and lying flat quality pain is moderate severe intensity alleviated by lying flat he is done physical therapy 6 weeks and last 6 months without benefit acting exacerbated his pain this pain is currently impacting quality of his life    No allergies to contrast dyes are on blood thinners   ed and/or updated the patient's past medical history, past surgical history, family history, social history, current medications, allergies, and vital signs today.     Review of Systems    Musculoskeletal:  Positive for arthralgias, back pain and myalgias.   All other systems reviewed and are negative.      Patient Active Problem List   Diagnosis   • Atypical chest pain   • Anxiety   • Tachycardia   • Chronic gout of multiple sites   • Acute low back pain   • Renal insufficiency   • Fungal infection of nail   • Obesity (BMI 35.0-39.9 without comorbidity)   • Type 2 diabetes mellitus without complication, without long-term current use of insulin (HCC)   • Elevated liver enzymes   • Upper respiratory infection   • Umbilical hernia without obstruction and without gangrene       Past Medical History:   Diagnosis Date   • Tachycardia        Past Surgical History:   Procedure Laterality Date   • EYE SURGERY     • HERNIA REPAIR         Family History   Problem Relation Age of Onset   • Heart attack Father    • Heart attack Family        Social History     Occupational History   • Not on file   Tobacco Use   • Smoking status: Former     Types: Cigarettes     Passive exposure: Never   • Smokeless tobacco: Never   Vaping Use   • Vaping status: Never Used   Substance and Sexual Activity   • Alcohol use: Yes     Comment: occasional    • Drug use: Never   • Sexual activity: Not on file       Current Outpatient Medications on File Prior to Visit   Medication Sig   • diclofenac sodium (VOLTAREN) 50 mg EC tablet Take 1 tablet (50 mg total) by mouth 2 (two) times a day   • febuxostat (ULORIC) 80 MG TABS Take 1 tablet (80 mg total) by mouth daily   • methocarbamol (ROBAXIN) 500 mg tablet Take 1 tablet (500 mg total) by mouth 3 (three) times a day   • methylPREDNISolone 4 MG tablet therapy pack Use as directed on package   • Multiple Vitamin (ONE-A-DAY MENS PO) Take by mouth   • sildenafil (VIAGRA) 100 mg tablet Take 1 tablet (100 mg total) by mouth daily as needed for erectile dysfunction   • tirzepatide (Mounjaro) 10 MG/0.5ML Inject 0.5 mL (10 mg total) under the skin every 7 days   • ciclopirox (PENLAC) 8 %  "solution Apply topically daily at bedtime (Patient not taking: Reported on 5/30/2024)     No current facility-administered medications on file prior to visit.       Allergies   Allergen Reactions   • Sulfamethoxazole-Trimethoprim          Physical Exam    /92   Ht 6' 2\" (1.88 m)   Wt 122 kg (270 lb)   BMI 34.67 kg/m²         MSK:      Lumbar Spine:  No masses or atrophy,    Range of motion - Decreased extension/flexion  Palpation - Tenderness to palpation in the lumbar parapsinals   PSIS tenderness no  Bo's/DONTA no  Gaenslen's no  SLR positive left       Strength Right Left   Hip flexion L1,2 5 5   Knee extension L3 5 5   Ankle dorsiflexion L4 5 5   Great toe extension L5 5 5   Ankle Plantarflexion S1 5 5       Sensory Exam:  intact to light touch bilateral LE       Reflexes:     Right Left   Biceps 2+ 2+   Triceps 2+ 2+   Brachioradialis 2+ 2+   Patellar 2+ 2+   Achilles 2+ 2+   Babinski neg neg        Gait normal                  Imaging 6/24    MRI L spine  IMPRESSION:  Spondylotic changes of the lumbar spine most pronounced at the L5-S1 level with a right paracentral disc protrusion resulting in posterior displacement of the right S1 nerve root through the subarticular recess and concerning for impingement.   Additionally, bilateral facet arthrosis results in mild to moderate right and moderate left foraminal encroachment. Correlate clinically for left greater than right L5 radiculopathy.       "

## 2024-08-27 NOTE — ASSESSMENT & PLAN NOTE
History of gout currently taking Uloric 80 mg daily appears to be controlling well. Mild wheezing on exam   satting well on room air  Pulmonology consulted-recommending to  start IV steroids and monitor  Shortness of breath likely component of seasonal allergies with anxiety   STEVEN DEWEY. Patient contacted and informed that she is due for her annual mammogram screening. Patient states please do not place a order for mammogram screening because she has a  $0732 Insurance deductible and can not afford testing.    Informed patient of

## 2024-09-04 ENCOUNTER — TELEPHONE (OUTPATIENT)
Dept: FAMILY MEDICINE CLINIC | Facility: CLINIC | Age: 44
End: 2024-09-04

## 2024-09-04 DIAGNOSIS — E11.9 TYPE 2 DIABETES MELLITUS WITHOUT COMPLICATION, WITHOUT LONG-TERM CURRENT USE OF INSULIN (HCC): ICD-10-CM

## 2024-09-04 NOTE — TELEPHONE ENCOUNTER
Pt called to request a  refill        tirzepatide (Mounjaro) 10 MG/0.5ML [Geremias Wyatt MD]     Preferred pharmacy: [id number] - [place] - [address]

## 2024-09-09 ENCOUNTER — HOSPITAL ENCOUNTER (OUTPATIENT)
Dept: RADIOLOGY | Facility: HOSPITAL | Age: 44
Discharge: HOME | End: 2024-09-09
Attending: PAIN MEDICINE | Admitting: PAIN MEDICINE
Payer: COMMERCIAL

## 2024-09-09 VITALS
TEMPERATURE: 97.2 F | SYSTOLIC BLOOD PRESSURE: 121 MMHG | HEART RATE: 107 BPM | DIASTOLIC BLOOD PRESSURE: 85 MMHG | OXYGEN SATURATION: 94 % | RESPIRATION RATE: 17 BRPM

## 2024-09-09 DIAGNOSIS — M54.16 LUMBAR RADICULOPATHY: ICD-10-CM

## 2024-09-09 DIAGNOSIS — M51.26 LUMBAR DISC HERNIATION: ICD-10-CM

## 2024-09-09 DIAGNOSIS — M47.816 LUMBAR SPONDYLOSIS: ICD-10-CM

## 2024-09-09 RX ORDER — LIDOCAINE HYDROCHLORIDE 10 MG/ML
5 INJECTION, SOLUTION EPIDURAL; INFILTRATION; INTRACAUDAL; PERINEURAL ONCE
Status: COMPLETED | OUTPATIENT
Start: 2024-09-09 | End: 2024-09-09

## 2024-09-09 RX ORDER — METHYLPREDNISOLONE ACETATE 80 MG/ML
80 INJECTION, SUSPENSION INTRA-ARTICULAR; INTRALESIONAL; INTRAMUSCULAR; PARENTERAL; SOFT TISSUE ONCE
Status: COMPLETED | OUTPATIENT
Start: 2024-09-09 | End: 2024-09-09

## 2024-09-09 RX ORDER — GABAPENTIN 300 MG/1
CAPSULE ORAL
Qty: 90 CAPSULE | Refills: 0 | Status: SHIPPED | OUTPATIENT
Start: 2024-09-09

## 2024-09-09 RX ADMIN — IOHEXOL 1 ML: 300 INJECTION, SOLUTION INTRAVENOUS at 15:51

## 2024-09-09 RX ADMIN — METHYLPREDNISOLONE ACETATE 80 MG: 80 INJECTION, SUSPENSION INTRA-ARTICULAR; INTRALESIONAL; INTRAMUSCULAR; SOFT TISSUE at 15:51

## 2024-09-09 RX ADMIN — LIDOCAINE HYDROCHLORIDE 2 ML: 10 INJECTION, SOLUTION EPIDURAL; INFILTRATION; INTRACAUDAL; PERINEURAL at 15:51

## 2024-09-09 NOTE — DISCHARGE INSTR - LAB
Epidural Steroid Injection   WHAT YOU NEED TO KNOW:   An epidural steroid injection (ALFONSO) is a procedure to inject steroid medicine into the epidural space. The epidural space is between your spinal cord and vertebrae. Steroids reduce inflammation and fluid buildup in your spine that may be causing pain. You may be given pain medicine along with the steroids.          ACTIVITY  Do not drive or operate machinery today.  No strenuous activity today - bending, lifting, etc.  You may resume normal activites starting tomorrow - start slowly and as tolerated.  You may shower today, but no tub baths or hot tubs.  You may have numbness for several hours from the local anesthetic. Please use caution and common sense, especially with weight-bearing activities.    CARE OF THE INJECTION SITE  If you have soreness or pain, apply ice to the area today (20 minutes on/20 minutes off).  Starting tomorrow, you may use warm, moist heat or ice if needed.  You may have an increase or change in your discomfort for 36-48 hours after your treatment.  Apply ice and continue with any pain medication you have been prescribed.  Notify the [facility] if you have any of the following: redness, drainage, swelling, headache, stiff neck or fever above 100°F.    SPECIAL INSTRUCTIONS  Our office will contact you in approximately 14 days for a progress report.    MEDICATIONS  Continue to take all routine medications.  Our office may have instructed you to hold some medications.    As no general anesthesia was used in today's procedure, you should not experience any side effects related to anesthesia.     If you are diabetic, the steroids used in today's injection may temporarily increase your blood sugar levels after the first few days after your injection. Please keep a close eye on your sugars and alert the doctor who manages your diabetes if your sugars are significantly high from your baseline or you are symptomatic.     If you have a  problem specifically related to your procedure, please call our office at [phone].  Problems not related to your procedure should be directed to your primary care physician.

## 2024-09-17 ENCOUNTER — TELEPHONE (OUTPATIENT)
Dept: FAMILY MEDICINE CLINIC | Facility: CLINIC | Age: 44
End: 2024-09-17

## 2024-09-17 DIAGNOSIS — F32.A DEPRESSION, UNSPECIFIED DEPRESSION TYPE: Primary | ICD-10-CM

## 2024-09-18 ENCOUNTER — TELEPHONE (OUTPATIENT)
Age: 44
End: 2024-09-18

## 2024-09-18 NOTE — TELEPHONE ENCOUNTER
Received new referral for pt for TT services. Pt showing to already be on the wait list. Closing referral.

## 2024-09-20 ENCOUNTER — TELEPHONE (OUTPATIENT)
Dept: FAMILY MEDICINE CLINIC | Facility: CLINIC | Age: 44
End: 2024-09-20

## 2024-09-23 ENCOUNTER — TELEPHONE (OUTPATIENT)
Dept: PAIN MEDICINE | Facility: MEDICAL CENTER | Age: 44
End: 2024-09-23

## 2024-09-23 NOTE — TELEPHONE ENCOUNTER
Patient reports 60-70% improvement post inj  Pain level 0/10 per patient as of right now but still has pain at times.

## 2024-09-26 ENCOUNTER — TELEPHONE (OUTPATIENT)
Age: 44
End: 2024-09-26

## 2024-09-29 DIAGNOSIS — E11.9 TYPE 2 DIABETES MELLITUS WITHOUT COMPLICATION, WITHOUT LONG-TERM CURRENT USE OF INSULIN (HCC): ICD-10-CM

## 2024-10-11 ENCOUNTER — OFFICE VISIT (OUTPATIENT)
Dept: PAIN MEDICINE | Facility: CLINIC | Age: 44
End: 2024-10-11
Payer: COMMERCIAL

## 2024-10-11 ENCOUNTER — HOSPITAL ENCOUNTER (OUTPATIENT)
Dept: RADIOLOGY | Facility: HOSPITAL | Age: 44
End: 2024-10-11
Payer: COMMERCIAL

## 2024-10-11 VITALS — WEIGHT: 270 LBS | HEIGHT: 74 IN | BODY MASS INDEX: 34.65 KG/M2

## 2024-10-11 DIAGNOSIS — M47.816 LUMBAR SPONDYLOSIS: Primary | ICD-10-CM

## 2024-10-11 DIAGNOSIS — M54.12 CERVICAL RADICULOPATHY: ICD-10-CM

## 2024-10-11 DIAGNOSIS — M51.26 LUMBAR DISC HERNIATION: ICD-10-CM

## 2024-10-11 DIAGNOSIS — M54.16 LUMBAR RADICULOPATHY: ICD-10-CM

## 2024-10-11 PROCEDURE — 72050 X-RAY EXAM NECK SPINE 4/5VWS: CPT

## 2024-10-11 PROCEDURE — 99214 OFFICE O/P EST MOD 30 MIN: CPT | Performed by: PAIN MEDICINE

## 2024-10-11 RX ORDER — METHOCARBAMOL 500 MG/1
500 TABLET, FILM COATED ORAL 3 TIMES DAILY
Qty: 90 TABLET | Refills: 1 | Status: SHIPPED | OUTPATIENT
Start: 2024-10-11

## 2024-10-11 NOTE — PROGRESS NOTES
Assessment  1. Lumbar spondylosis  2. Lumbar disc herniation  3. Cervical radiculopathy      Erik is a pleasant 44-year-old male with history of left leg radicular pain now improved after epidural now complaining of axial low back pain with tenderness to palpation lumbar facets and positive facet loading test on exam his pain is predominately axial in nature will recommend a left L3-4-5 diagnostic medial branch block for his neck pain symptoms we will start him on physical therapy obtain an x-ray of his neck and recommend the patient start diclofenac and Robaxin again for his pain symptoms.  If no improvement in 6 weeks we will consider obtaining an MRI of his cervical spine.      The risks of the procedure were discussed in detail.  These risks include infection, increased pain, paralysis, bleeding.  Patient understands the risks and is willing to pursue with the procedure        My impressions and treatment recommendations were discussed in detail with the patient who verbalized understanding and had no further questions.  Discharge instructions were provided. I personally saw and examined the patient and I agree with the above discussed plan of care.    Plan      No orders of the defined types were placed in this encounter.          No orders of the defined types were placed in this encounter.          History of Present Illness  Follow-up 10/11/2024  Erik comes for follow-up status post L3-4 interlaminar epidural steroid injection with about 50% improvement in his low back pain he still has pain with increased with standing improved with sitting without radiation to his lower extremities his left leg radicular pain has improved substantially now with more axial low back pain than radicular symptoms he is also complaining of neck pain that radiates into his right upper extremity he is right-handed that the numbness and tingling into the fingers digits 1 through 3.  Of note he is not taking diclofenac he  is only taking gabapentin.      Follow-up 8/9/2024    Erik is status post left L4-5 L3-4 transforaminal and reports about 40% improvement in his low back and left leg pain symptoms and the less radicular pain less severity of pain still having pain in the lower back at the L3 445 level with radiation into the left buttocks and down to the anterior thigh.    He is not getting benefit methocarbamol 500 mg 3 times daily as he gets relief with diclofenac 50 mg twice a day.    Initial consultation  Erik White is a 44 y.o. male with relevant PMH of DM II    Presented to [facility] spine [facility] for chief complaint of low back pain left side rating into the left leg in the L4-5 distribution occasionally goes down to his left calf and back of the leg.  The symptoms include burning aching throbbing gets back pain when he stands for long period of time improved with sitting and lying flat quality pain is moderate severe intensity alleviated by lying flat he is done physical therapy 6 weeks and last 6 months without benefit acting exacerbated his pain this pain is currently impacting quality of his life    No allergies to contrast dyes are on blood thinners   ed and/or updated the patient's past medical history, past surgical history, family history, social history, current medications, allergies, and vital signs today.     Review of Systems   Musculoskeletal:  Positive for arthralgias, back pain and myalgias.   All other systems reviewed and are negative.      Patient Active Problem List   Diagnosis    Atypical chest pain    Anxiety    Tachycardia    Chronic gout of multiple sites    Acute low back pain    Renal insufficiency    Fungal infection of nail    Obesity (BMI 35.0-39.9 without comorbidity)    Type 2 diabetes mellitus without complication, without long-term current use of insulin (HCC)    Elevated liver enzymes    Upper respiratory infection    Umbilical hernia without obstruction and without gangrene  "      Past Medical History:   Diagnosis Date    Tachycardia        Past Surgical History:   Procedure Laterality Date    EYE SURGERY      HERNIA REPAIR         Family History   Problem Relation Age of Onset    Heart attack Father     Heart attack Family        Social History     Occupational History    Not on file   Tobacco Use    Smoking status: Former     Types: Cigarettes     Passive exposure: Never    Smokeless tobacco: Never   Vaping Use    Vaping status: Never Used   Substance and Sexual Activity    Alcohol use: Yes     Comment: occasional     Drug use: Never    Sexual activity: Not on file       Current Outpatient Medications on File Prior to Visit   Medication Sig    diclofenac sodium (VOLTAREN) 50 mg EC tablet Take 1 tablet (50 mg total) by mouth 2 (two) times a day    febuxostat (ULORIC) 80 MG TABS Take 1 tablet (80 mg total) by mouth daily    gabapentin (NEURONTIN) 300 mg capsule Take 1 tablet at bedtime for 3 days, then 1 tablet twice daily for 3 days, then 1 tablet 3 times daily    methocarbamol (ROBAXIN) 500 mg tablet Take 1 tablet (500 mg total) by mouth 3 (three) times a day    methylPREDNISolone 4 MG tablet therapy pack Use as directed on package    Multiple Vitamin (ONE-A-DAY MENS PO) Take by mouth    sildenafil (VIAGRA) 100 mg tablet Take 1 tablet (100 mg total) by mouth daily as needed for erectile dysfunction    tirzepatide (Mounjaro) 10 MG/0.5ML Inject 0.5 mL (10 mg total) under the skin every 7 days    ciclopirox (PENLAC) 8 % solution Apply topically daily at bedtime (Patient not taking: Reported on 5/30/2024)     No current facility-administered medications on file prior to visit.       Allergies   Allergen Reactions    Sulfamethoxazole-Trimethoprim          Physical Exam    Ht 6' 2\" (1.88 m)   Wt 122 kg (270 lb)   BMI 34.67 kg/m²         MSK:      Lumbar Spine:  No masses or atrophy,    Range of motion - Decreased extension/flexion  Palpation - Tenderness to palpation in the lumbar " parapsinals   PSIS tenderness no  Bo's/DONTA no  Gaenslen's no  SLR positive left       Strength Right Left   Hip flexion L1,2 5 5   Knee extension L3 5 5   Ankle dorsiflexion L4 5 5   Great toe extension L5 5 5   Ankle Plantarflexion S1 5 5       Sensory Exam:  intact to light touch bilateral LE       Reflexes:     Right Left   Biceps 2+ 2+   Triceps 2+ 2+   Brachioradialis 2+ 2+   Patellar 2+ 2+   Achilles 2+ 2+   Babinski neg neg        Gait normal    Cervical Spine:  No masses or atrophy, NTTP cervical facets   Range of motion - defcreased right  Spurling's - positive    Strength Right Left   Elbow flexion/deltoid (C5) 5 5   Wrist extension (c6) 5 5   Elbow /finger extension (c7) 5 5   Finger flexion (c8) 5 5   Intrinsics (t1) 5 5               Sensory Exam: Full and equal sensation to light touch throughout bilateral UE  Reflexes: Cochran negative Bilaterally; Clonus negative bilaterally                        Imaging 6/24    MRI L spine  IMPRESSION:  Spondylotic changes of the lumbar spine most pronounced at the L5-S1 level with a right paracentral disc protrusion resulting in posterior displacement of the right S1 nerve root through the subarticular recess and concerning for impingement.   Additionally, bilateral facet arthrosis results in mild to moderate right and moderate left foraminal encroachment. Correlate clinically for left greater than right L5 radiculopathy.     Procedure  7/22/2024  Left L3-4-5 transforaminal   9/9/2024  L3-4 interlaminar

## 2024-10-17 ENCOUNTER — TELEPHONE (OUTPATIENT)
Dept: PAIN MEDICINE | Facility: CLINIC | Age: 44
End: 2024-10-17

## 2024-10-17 NOTE — TELEPHONE ENCOUNTER
----- Message from Spencer Garcia MD sent at 10/17/2024  3:45 PM EDT -----  Mild disc diseae at C56

## 2024-10-31 ENCOUNTER — TELEPHONE (OUTPATIENT)
Age: 44
End: 2024-10-31

## 2024-10-31 DIAGNOSIS — E66.9 OBESITY (BMI 35.0-39.9 WITHOUT COMORBIDITY): ICD-10-CM

## 2024-10-31 DIAGNOSIS — E11.9 TYPE 2 DIABETES MELLITUS WITHOUT COMPLICATION, WITHOUT LONG-TERM CURRENT USE OF INSULIN (HCC): Primary | ICD-10-CM

## 2024-10-31 RX ORDER — TIRZEPATIDE 10 MG/.5ML
10 INJECTION, SOLUTION SUBCUTANEOUS WEEKLY
Qty: 2 ML | Refills: 0 | Status: SHIPPED | OUTPATIENT
Start: 2024-10-31

## 2024-10-31 NOTE — TELEPHONE ENCOUNTER
Patient called the RX Refill Line. Message is being forwarded to the office.     Patient is requesting a prescription for Mounjaro 10 mg. I cannot que it up because this box keeps popping up .            Please contact patient at  [phone]

## 2024-11-13 ENCOUNTER — TELEPHONE (OUTPATIENT)
Dept: PSYCHIATRY | Facility: CLINIC | Age: 44
End: 2024-11-13

## 2024-11-13 NOTE — TELEPHONE ENCOUNTER
Forms sent via [employer].    Patient is aware that forms should be completed via [employer] prior to appt.

## 2024-11-14 ENCOUNTER — HOSPITAL ENCOUNTER (OUTPATIENT)
Dept: RADIOLOGY | Facility: HOSPITAL | Age: 44
Discharge: HOME | End: 2024-11-14
Attending: PAIN MEDICINE
Payer: COMMERCIAL

## 2024-11-14 VITALS
DIASTOLIC BLOOD PRESSURE: 92 MMHG | TEMPERATURE: 96.9 F | RESPIRATION RATE: 18 BRPM | OXYGEN SATURATION: 97 % | SYSTOLIC BLOOD PRESSURE: 128 MMHG | HEART RATE: 109 BPM

## 2024-11-14 DIAGNOSIS — M47.816 LUMBAR SPONDYLOSIS: ICD-10-CM

## 2024-11-14 PROCEDURE — 64493 INJ PARAVERT F JNT L/S 1 LEV: CPT | Performed by: PAIN MEDICINE

## 2024-11-14 PROCEDURE — 64494 INJ PARAVERT F JNT L/S 2 LEV: CPT | Performed by: PAIN MEDICINE

## 2024-11-14 RX ORDER — BUPIVACAINE HCL/PF 2.5 MG/ML
3 VIAL (ML) INJECTION ONCE
Status: COMPLETED | OUTPATIENT
Start: 2024-11-14 | End: 2024-11-14

## 2024-11-14 RX ADMIN — BUPIVACAINE HYDROCHLORIDE 3 ML: 2.5 INJECTION, SOLUTION EPIDURAL; INFILTRATION; INTRACAUDAL at 15:50

## 2024-11-14 NOTE — DISCHARGE INSTR - LAB

## 2024-11-14 NOTE — H&P
History of Present Illness: The patient is a 44 y.o. male who presents with complaints of low back pain here for Left L345 MBB # 1    Past Medical History:   Diagnosis Date    Tachycardia        Past Surgical History:   Procedure Laterality Date    EYE SURGERY      HERNIA REPAIR           Current Outpatient Medications:     ciclopirox (PENLAC) 8 % solution, Apply topically daily at bedtime (Patient not taking: Reported on 5/30/2024), Disp: 6 mL, Rfl: 1    diclofenac sodium (VOLTAREN) 50 mg EC tablet, Take 1 tablet (50 mg total) by mouth 2 (two) times a day, Disp: 60 tablet, Rfl: 0    febuxostat (ULORIC) 80 MG TABS, Take 1 tablet (80 mg total) by mouth daily, Disp: 90 tablet, Rfl: 1    gabapentin (NEURONTIN) 300 mg capsule, Take 1 tablet at bedtime for 3 days, then 1 tablet twice daily for 3 days, then 1 tablet 3 times daily, Disp: 90 capsule, Rfl: 0    methocarbamol (ROBAXIN) 500 mg tablet, Take 1 tablet (500 mg total) by mouth 3 (three) times a day, Disp: 90 tablet, Rfl: 1    methylPREDNISolone 4 MG tablet therapy pack, Use as directed on package, Disp: 21 each, Rfl: 0    Multiple Vitamin (ONE-A-DAY MENS PO), Take by mouth, Disp: , Rfl:     sildenafil (VIAGRA) 100 mg tablet, Take 1 tablet (100 mg total) by mouth daily as needed for erectile dysfunction, Disp: 10 tablet, Rfl: 0    Tirzepatide (Mounjaro) 10 MG/0.5ML SOAJ, Inject 10 mg under the skin once a week, Disp: 2 mL, Rfl: 0    tirzepatide (Mounjaro) 10 MG/0.5ML, Inject 0.5 mL (10 mg total) under the skin every 7 days, Disp: 2 mL, Rfl: 0    Current Facility-Administered Medications:     bupivacaine (PF) (MARCAINE) 0.25 % injection 3 mL, 3 mL, Perineural, Once, Spencer Garcia MD    Allergies   Allergen Reactions    Sulfamethoxazole-Trimethoprim        Physical Exam: There were no vitals filed for this visit.  General: Awake, Alert, Oriented x 3, Mood and affect appropriate  Respiratory: Respirations even and unlabored  Cardiovascular: Peripheral  pulses intact; no edema  Musculoskeletal Exam: TTP lumbar facets    ASA Score: 2         Assessment:   1. Lumbar spondylosis        Plan: left L345 MBB # 1    The risks of the procedure were discussed in detail.  These risks include infection, increased pain, paralysis, bleeding.  Patient understands the risks and is willing to pursue with the procedure

## 2024-11-21 ENCOUNTER — TELEPHONE (OUTPATIENT)
Age: 44
End: 2024-11-21

## 2024-11-21 DIAGNOSIS — M47.816 LUMBAR SPONDYLOSIS: Primary | ICD-10-CM

## 2024-11-21 NOTE — PROGRESS NOTES
80% relief after Left L345 MBB # 1, will schedule repeat Left L345 MBB 3 #2    The risks of the procedure were discussed in detail.  These risks include infection, increased pain, paralysis, bleeding.  Patient understands the risks and is willing to pursue with the procedure

## 2024-11-21 NOTE — TELEPHONE ENCOUNTER
Caller: Erik     Doctor: AR    Reason for call: pt has sent in pain diary and is ready to proceed with the next steps of scheduling another procedure    Call back#: [phone]

## 2024-11-22 NOTE — TELEPHONE ENCOUNTER
Caller: Patient     Doctor:      Reason for call: Patient was calling back to see if there is any way to bring him in sooner than 1/2/25.     He is in a lot of pain and does not see how waiting is beneficial. Would like to know if  is not available to do this procedure before 1/2/25 if there would be another provider who can.    Patient is moving out of the area and looking to have this second procedure done prior to that and waiting till January will be too long.    Please advise     Call back#: [phone]

## 2024-11-22 NOTE — TELEPHONE ENCOUNTER
Contacted patient to schedule 2nd MBB, advised patient next available is 1/2/25.  Patient very upset about having to wait that long for the next procedure.  He is relocating the end of January and was hoping to have this process completed by then.  He feels he has had to wait too long for any services at [facility] lately and if we can not see him sooner, he is going to seek treatment at [facility].

## 2024-11-25 ENCOUNTER — TELEPHONE (OUTPATIENT)
Dept: FAMILY MEDICINE CLINIC | Facility: CLINIC | Age: 44
End: 2024-11-25

## 2024-11-25 NOTE — TELEPHONE ENCOUNTER
Patient called the RX Refill Line. Message is being forwarded to the office.     Patient is requesting a refill of the xanax, pt stated that he was given a script a while ago,the bottle he has at home  2 years ago so he doesn't want to take them and is hoping to get another script sent in to the [facility] on file   Pt doesn't remember the strength but stated it was Dr Mora that sent the last script     Please contact patient at  if any questions

## 2024-11-27 NOTE — PROGRESS NOTES
Adult Annual Physical  Name: Erik White      : 1980      MRN: [id number]  Encounter Provider: Sylvester Mora MD  Encounter Date: 2024   Encounter department: [facility]    Assessment & Plan  Type 2 diabetes mellitus without complication, without long-term current use of insulin ([facility])    Lab Results   Component Value Date    HGBA1C 5.6 2024   Currently on Mounjaro last A1c 5.6 will follow-up with repeat 1 and decide about the dosage.    Orders:    POCT hemoglobin A1c    Albumin / creatinine urine ratio; Future    UA w Reflex to Microscopic w Reflex to Culture; Future    Hemoglobin A1C; Future    Tirzepatide (Mounjaro) 10 MG/0.5ML SOAJ; Inject 10 mg under the skin once a week    Acute left-sided low back pain with left-sided sciatica  As mentioned in the history of present illness patient is being followed by spine and pain management physician with an abnormal MRI received 2 epidural injections scheduled to have an nerve blocks done soon.  Currently not on any medications he was on gabapentin before.         Chronic gout of multiple sites, unspecified cause  No episodes of gout currently on Uloric 80 mg daily will get a uric acid level    Orders:    Uric acid; Future    febuxostat (ULORIC) 80 MG TABS; Take 1 tablet (80 mg total) by mouth daily    Renal insufficiency  Last creatinine level was 1.44 will follow-up with repeat 1 recommend patient to avoid nonsteroidals         Obesity (BMI 35.0-39.9 without comorbidity)  {If prescribing weight loss medication, click here to fill out prior auth smartform and then hit F2 with this smartlist to insert prior auth documentation (Optional):[id number]  Patient had lost total of 45 pounds in the last 10 months currently on Mounjaro 10 mg weekly we will get a repeat hemoglobin A1c also and decide about the dosage and the tapering if needed.    Orders:    Tirzepatide (Mounjaro) 10 MG/0.5ML SOAJ; Inject 10 mg under the skin once a  week    Screening for colon cancer         Prediabetes    Orders:    Comprehensive metabolic panel; Future    Anxiety  As mentioned in the history of present illness patient has been getting some panic attacks we will give him few tablets of Xanax also patient has an appointment with the psychologist next month.         Umbilical hernia without obstruction and without gangrene  Currently umbilical hernia is not bothering him and he has been also losing weight with Mounjaro will monitor for now.         Screening for thyroid disorder         Screening for deficiency anemia         Dyslipidemia    Orders:    Lipid panel; Future    Encounter for screening colonoscopy    Orders:    Ambulatory referral to Gastroenterology; Future    Annual physical exam         Immunizations and preventive care screenings were discussed with patient today. Appropriate education was printed on patient's after visit summary.    Discussed risks and benefits of prostate cancer screening. We discussed the controversial history of PSA screening for prostate cancer in the [place] as well as the risk of over detection and over treatment of prostate cancer by way of PSA screening.  The patient understands that PSA blood testing is an imperfect way to screen for prostate cancer and that elevated PSA levels in the blood may also be caused by infection, inflammation, prostatic trauma or manipulation, urological procedures, or by benign prostatic enlargement.    The role of the digital rectal examination in prostate cancer screening was also discussed and I discussed with him that there is large interobserver variability in the findings of digital rectal examination.    Counseling:  Alcohol/drug use: discussed moderation in alcohol intake, the recommendations for healthy alcohol use, and avoidance of illicit drug use.  Dental Health: discussed importance of regular tooth brushing, flossing, and dental visits.  Injury prevention: discussed  safety/seat belts, safety helmets, smoke detectors, carbon monoxide detectors, and smoking near bedding or upholstery.  Sexual health: discussed sexually transmitted diseases, partner selection, use of condoms, avoidance of unintended pregnancy, and contraceptive alternatives.  Exercise: the importance of regular exercise/physical activity was discussed. Recommend exercise 3-5 times per week for at least 30 minutes.          History of Present Illness patient is coming here for a follow-up evaluation with regards to the symptoms of low back pain for which she is being followed by the spine and pain management physician.  He has received so far 2 epidural injection and is scheduled to have nerve block done this coming Friday.  Pain level is better compared to before tingling numbness has subsided in the lower extremities.  Medications reviewed labs reviewed currently not on any medications for the back he was on gabapentin Voltaren.    Patient with diabetes has been on Mounjaro and has lost about 45 pounds in last 10 months time.  No recent lab work available.  Will order the same.    Patient with anxiety disorder on and off has been getting some panic attacks also in the past about 3 years ago he had taken some Xanax for the same.  He is also scheduled to see the psychologist in the month of January.    Adult Annual Physical:  Patient presents for annual physical.     Diet and Physical Activity:  - Diet/Nutrition: portion control.  - Exercise: walking, 3-4 times a week on average and 30-60 minutes on average.    General Health:  - Sleep: 4-6 hours of sleep on average and sleeps well.  - Hearing: normal hearing bilateral ears.  - Vision: wears glasses and contacts, vision problems and goes for regular eye exams.  - Dental: no dental visits for > 1 year and brushes teeth twice daily. pt flosses     Health:  - History of STDs: no.   - Urinary symptoms: none.     Advanced Care Planning:  - Has an advanced directive?:  no    - Has a durable medical POA?: no    - ACP document given to patient?: yes      Review of Systems   Constitutional:  Negative for chills and fever.   HENT:  Negative for ear pain and sore throat.    Eyes:  Negative for pain and visual disturbance.   Respiratory:  Negative for cough and shortness of breath.    Cardiovascular:  Negative for chest pain and palpitations.   Gastrointestinal:  Negative for abdominal pain and vomiting.   Genitourinary:  Negative for dysuria and hematuria.   Musculoskeletal:  Positive for arthralgias and back pain.   Skin:  Negative for color change and rash.   Neurological:  Negative for seizures and syncope.   All other systems reviewed and are negative.    Pertinent Medical History   As in the history of present illness patient also has got history of gout on Uloric.  No episodes of gout in the recent past.      Medical History Reviewed by provider this encounter:     .  Past Medical History   Past Medical History:   Diagnosis Date    Tachycardia      Past Surgical History:   Procedure Laterality Date    EYE SURGERY      HERNIA REPAIR       Family History   Problem Relation Age of Onset    Heart attack Father     Heart attack Family       reports that he has quit smoking. His smoking use included cigarettes. He has never been exposed to tobacco smoke. He has never used smokeless tobacco. He reports current alcohol use. He reports that he does not use drugs.  Current Outpatient Medications on File Prior to Visit   Medication Sig Dispense Refill    sildenafil (VIAGRA) 100 mg tablet Take 1 tablet (100 mg total) by mouth daily as needed for erectile dysfunction 10 tablet 0    [DISCONTINUED] febuxostat (ULORIC) 80 MG TABS Take 1 tablet (80 mg total) by mouth daily 90 tablet 1    [DISCONTINUED] Tirzepatide (Mounjaro) 10 MG/0.5ML SOAJ Inject 10 mg under the skin once a week 2 mL 0    Multiple Vitamin (ONE-A-DAY MENS PO) Take by mouth (Patient not taking: Reported on 12/2/2024)       [DISCONTINUED] ciclopirox (PENLAC) 8 % solution Apply topically daily at bedtime (Patient not taking: Reported on 12/2/2024) 6 mL 1    [DISCONTINUED] diclofenac sodium (VOLTAREN) 50 mg EC tablet Take 1 tablet (50 mg total) by mouth 2 (two) times a day (Patient not taking: Reported on 12/2/2024) 60 tablet 0    [DISCONTINUED] gabapentin (NEURONTIN) 300 mg capsule Take 1 tablet at bedtime for 3 days, then 1 tablet twice daily for 3 days, then 1 tablet 3 times daily (Patient not taking: Reported on 12/2/2024) 90 capsule 0    [DISCONTINUED] methocarbamol (ROBAXIN) 500 mg tablet Take 1 tablet (500 mg total) by mouth 3 (three) times a day (Patient not taking: Reported on 12/2/2024) 90 tablet 1    [DISCONTINUED] methylPREDNISolone 4 MG tablet therapy pack Use as directed on package (Patient not taking: Reported on 12/2/2024) 21 each 0    [DISCONTINUED] tirzepatide (Mounjaro) 10 MG/0.5ML Inject 0.5 mL (10 mg total) under the skin every 7 days (Patient not taking: Reported on 12/2/2024) 2 mL 0     No current facility-administered medications on file prior to visit.     Allergies   Allergen Reactions    Sulfamethoxazole-Trimethoprim       Current Outpatient Medications on File Prior to Visit   Medication Sig Dispense Refill    sildenafil (VIAGRA) 100 mg tablet Take 1 tablet (100 mg total) by mouth daily as needed for erectile dysfunction 10 tablet 0    [DISCONTINUED] febuxostat (ULORIC) 80 MG TABS Take 1 tablet (80 mg total) by mouth daily 90 tablet 1    [DISCONTINUED] Tirzepatide (Mounjaro) 10 MG/0.5ML SOAJ Inject 10 mg under the skin once a week 2 mL 0    Multiple Vitamin (ONE-A-DAY MENS PO) Take by mouth (Patient not taking: Reported on 12/2/2024)      [DISCONTINUED] ciclopirox (PENLAC) 8 % solution Apply topically daily at bedtime (Patient not taking: Reported on 12/2/2024) 6 mL 1    [DISCONTINUED] diclofenac sodium (VOLTAREN) 50 mg EC tablet Take 1 tablet (50 mg total) by mouth 2 (two) times a day (Patient not taking:  "Reported on 12/2/2024) 60 tablet 0    [DISCONTINUED] gabapentin (NEURONTIN) 300 mg capsule Take 1 tablet at bedtime for 3 days, then 1 tablet twice daily for 3 days, then 1 tablet 3 times daily (Patient not taking: Reported on 12/2/2024) 90 capsule 0    [DISCONTINUED] methocarbamol (ROBAXIN) 500 mg tablet Take 1 tablet (500 mg total) by mouth 3 (three) times a day (Patient not taking: Reported on 12/2/2024) 90 tablet 1    [DISCONTINUED] methylPREDNISolone 4 MG tablet therapy pack Use as directed on package (Patient not taking: Reported on 12/2/2024) 21 each 0    [DISCONTINUED] tirzepatide (Mounjaro) 10 MG/0.5ML Inject 0.5 mL (10 mg total) under the skin every 7 days (Patient not taking: Reported on 12/2/2024) 2 mL 0     No current facility-administered medications on file prior to visit.      Social History     Tobacco Use    Smoking status: Former     Types: Cigarettes     Passive exposure: Never    Smokeless tobacco: Never   Vaping Use    Vaping status: Never Used   Substance and Sexual Activity    Alcohol use: Yes     Comment: occasional     Drug use: Never    Sexual activity: Not on file       Objective   /74 (BP Location: Right arm, Patient Position: Sitting, Cuff Size: Large)   Pulse (!) 112   Ht 6' 2\" (1.88 m)   Wt 122 kg (270 lb)   SpO2 92%   BMI 34.67 kg/m²     Physical Exam  Vitals and nursing note reviewed.   Constitutional:       General: He is not in acute distress.     Appearance: He is well-developed.   HENT:      Head: Normocephalic and atraumatic.   Eyes:      Conjunctiva/sclera: Conjunctivae normal.   Cardiovascular:      Rate and Rhythm: Normal rate and regular rhythm.      Heart sounds: No murmur heard.  Pulmonary:      Effort: Pulmonary effort is normal. No respiratory distress.      Breath sounds: Normal breath sounds.   Abdominal:      Palpations: Abdomen is soft.      Tenderness: There is no abdominal tenderness.   Musculoskeletal:         General: No swelling.      Cervical back: " Neck supple.      Comments: Lumbar spine movement causing some discomfort   Skin:     General: Skin is warm and dry.      Capillary Refill: Capillary refill takes less than 2 seconds.   Neurological:      Mental Status: He is alert.   Psychiatric:         Mood and Affect: Mood normal.     No results found for this or any previous visit (from the past 8 weeks).

## 2024-12-02 ENCOUNTER — OFFICE VISIT (OUTPATIENT)
Dept: FAMILY MEDICINE CLINIC | Facility: CLINIC | Age: 44
End: 2024-12-02
Payer: COMMERCIAL

## 2024-12-02 VITALS
HEIGHT: 74 IN | OXYGEN SATURATION: 92 % | SYSTOLIC BLOOD PRESSURE: 126 MMHG | DIASTOLIC BLOOD PRESSURE: 74 MMHG | HEART RATE: 112 BPM | WEIGHT: 270 LBS | BODY MASS INDEX: 34.65 KG/M2

## 2024-12-02 DIAGNOSIS — M1A.09X0 CHRONIC GOUT OF MULTIPLE SITES, UNSPECIFIED CAUSE: ICD-10-CM

## 2024-12-02 DIAGNOSIS — K42.9 UMBILICAL HERNIA WITHOUT OBSTRUCTION AND WITHOUT GANGRENE: ICD-10-CM

## 2024-12-02 DIAGNOSIS — M54.42 ACUTE LEFT-SIDED LOW BACK PAIN WITH LEFT-SIDED SCIATICA: ICD-10-CM

## 2024-12-02 DIAGNOSIS — Z13.0 SCREENING FOR DEFICIENCY ANEMIA: ICD-10-CM

## 2024-12-02 DIAGNOSIS — Z13.29 SCREENING FOR THYROID DISORDER: ICD-10-CM

## 2024-12-02 DIAGNOSIS — Z00.00 ANNUAL PHYSICAL EXAM: ICD-10-CM

## 2024-12-02 DIAGNOSIS — Z12.11 SCREENING FOR COLON CANCER: ICD-10-CM

## 2024-12-02 DIAGNOSIS — R73.03 PREDIABETES: ICD-10-CM

## 2024-12-02 DIAGNOSIS — E78.5 DYSLIPIDEMIA: ICD-10-CM

## 2024-12-02 DIAGNOSIS — E11.9 TYPE 2 DIABETES MELLITUS WITHOUT COMPLICATION, WITHOUT LONG-TERM CURRENT USE OF INSULIN (HCC): Primary | ICD-10-CM

## 2024-12-02 DIAGNOSIS — E66.9 OBESITY (BMI 35.0-39.9 WITHOUT COMORBIDITY): ICD-10-CM

## 2024-12-02 DIAGNOSIS — N28.9 RENAL INSUFFICIENCY: ICD-10-CM

## 2024-12-02 DIAGNOSIS — Z12.11 ENCOUNTER FOR SCREENING COLONOSCOPY: ICD-10-CM

## 2024-12-02 DIAGNOSIS — F41.9 ANXIETY: ICD-10-CM

## 2024-12-02 DIAGNOSIS — N52.9 ERECTILE DYSFUNCTION, UNSPECIFIED ERECTILE DYSFUNCTION TYPE: ICD-10-CM

## 2024-12-02 PROCEDURE — 99214 OFFICE O/P EST MOD 30 MIN: CPT | Performed by: INTERNAL MEDICINE

## 2024-12-02 PROCEDURE — 99396 PREV VISIT EST AGE 40-64: CPT | Performed by: INTERNAL MEDICINE

## 2024-12-02 RX ORDER — FEBUXOSTAT 80 MG/1
80 TABLET, FILM COATED ORAL DAILY
Qty: 90 TABLET | Refills: 1 | Status: SHIPPED | OUTPATIENT
Start: 2024-12-02

## 2024-12-02 RX ORDER — SILDENAFIL 100 MG/1
100 TABLET, FILM COATED ORAL DAILY PRN
Qty: 10 TABLET | Refills: 1 | Status: SHIPPED | OUTPATIENT
Start: 2024-12-02

## 2024-12-02 RX ORDER — TIRZEPATIDE 10 MG/.5ML
10 INJECTION, SOLUTION SUBCUTANEOUS WEEKLY
Qty: 2 ML | Refills: 1 | Status: SHIPPED | OUTPATIENT
Start: 2024-12-02

## 2024-12-02 RX ORDER — ALPRAZOLAM 0.25 MG/1
0.25 TABLET ORAL
Qty: 20 TABLET | Refills: 0 | Status: SHIPPED | OUTPATIENT
Start: 2024-12-02

## 2024-12-02 NOTE — ASSESSMENT & PLAN NOTE
Currently umbilical hernia is not bothering him and he has been also losing weight with Mounjaro will monitor for now.

## 2024-12-02 NOTE — ASSESSMENT & PLAN NOTE
Last creatinine level was 1.44 will follow-up with repeat 1 recommend patient to avoid nonsteroidals

## 2024-12-02 NOTE — ASSESSMENT & PLAN NOTE
As mentioned in the history of present illness patient is being followed by spine and pain management physician with an abnormal MRI received 2 epidural injections scheduled to have an nerve blocks done soon.  Currently not on any medications he was on gabapentin before.

## 2024-12-02 NOTE — ASSESSMENT & PLAN NOTE
{If prescribing weight loss medication, click here to fill out prior auth smartform and then hit F2 with this smartlist to insert prior auth documentation (Optional):[id number]  Patient had lost total of 45 pounds in the last 10 months currently on Mounjaro 10 mg weekly we will get a repeat hemoglobin A1c also and decide about the dosage and the tapering if needed.    Orders:    Tirzepatide (Mounjaro) 10 MG/0.5ML SOAJ; Inject 10 mg under the skin once a week

## 2024-12-02 NOTE — ASSESSMENT & PLAN NOTE
Lab Results   Component Value Date    HGBA1C 5.6 05/05/2024   Currently on Mounjaro last A1c 5.6 will follow-up with repeat 1 and decide about the dosage.    Orders:    POCT hemoglobin A1c    Albumin / creatinine urine ratio; Future    UA w Reflex to Microscopic w Reflex to Culture; Future    Hemoglobin A1C; Future    Tirzepatide (Mounjaro) 10 MG/0.5ML SOAJ; Inject 10 mg under the skin once a week

## 2024-12-02 NOTE — ASSESSMENT & PLAN NOTE
No episodes of gout currently on Uloric 80 mg daily will get a uric acid level    Orders:    Uric acid; Future    febuxostat (ULORIC) 80 MG TABS; Take 1 tablet (80 mg total) by mouth daily

## 2024-12-02 NOTE — ASSESSMENT & PLAN NOTE
As mentioned in the history of present illness patient has been getting some panic attacks we will give him few tablets of Xanax also patient has an appointment with the psychologist next month.

## 2024-12-06 ENCOUNTER — HOSPITAL ENCOUNTER (OUTPATIENT)
Dept: RADIOLOGY | Facility: HOSPITAL | Age: 44
Discharge: HOME | End: 2024-12-06
Attending: PAIN MEDICINE
Payer: COMMERCIAL

## 2024-12-06 VITALS
OXYGEN SATURATION: 95 % | SYSTOLIC BLOOD PRESSURE: 134 MMHG | RESPIRATION RATE: 17 BRPM | DIASTOLIC BLOOD PRESSURE: 92 MMHG | HEART RATE: 95 BPM | TEMPERATURE: 96.4 F

## 2024-12-06 DIAGNOSIS — M47.816 LUMBAR SPONDYLOSIS: ICD-10-CM

## 2024-12-06 PROCEDURE — 64494 INJ PARAVERT F JNT L/S 2 LEV: CPT | Performed by: PAIN MEDICINE

## 2024-12-06 PROCEDURE — 64493 INJ PARAVERT F JNT L/S 1 LEV: CPT | Performed by: PAIN MEDICINE

## 2024-12-06 RX ORDER — BUPIVACAINE HYDROCHLORIDE 5 MG/ML
1.5 INJECTION, SOLUTION EPIDURAL; INTRACAUDAL ONCE
Status: COMPLETED | OUTPATIENT
Start: 2024-12-06 | End: 2024-12-06

## 2024-12-06 RX ORDER — BUPIVACAINE HYDROCHLORIDE 5 MG/ML
5 INJECTION, SOLUTION EPIDURAL; INTRACAUDAL ONCE
Status: DISCONTINUED | OUTPATIENT
Start: 2024-12-06 | End: 2024-12-06

## 2024-12-06 RX ADMIN — BUPIVACAINE HYDROCHLORIDE 1.5 ML: 5 INJECTION, SOLUTION EPIDURAL; INTRACAUDAL at 08:48

## 2024-12-06 NOTE — DISCHARGE INSTR - LAB

## 2024-12-06 NOTE — H&P
History of Present Illness: The patient is a 44 y.o. male who presents with complaints of low back pain.    Past Medical History:   Diagnosis Date    Tachycardia        Past Surgical History:   Procedure Laterality Date    EYE SURGERY      HERNIA REPAIR           Current Outpatient Medications:     ALPRAZolam (XANAX) 0.25 mg tablet, Take 1 tablet (0.25 mg total) by mouth daily at bedtime as needed for anxiety, Disp: 20 tablet, Rfl: 0    febuxostat (ULORIC) 80 MG TABS, Take 1 tablet (80 mg total) by mouth daily, Disp: 90 tablet, Rfl: 1    Multiple Vitamin (ONE-A-DAY MENS PO), Take by mouth (Patient not taking: Reported on 12/2/2024), Disp: , Rfl:     sildenafil (VIAGRA) 100 mg tablet, Take 1 tablet (100 mg total) by mouth daily as needed for erectile dysfunction, Disp: 10 tablet, Rfl: 1    Tirzepatide (Mounjaro) 10 MG/0.5ML SOAJ, Inject 10 mg under the skin once a week, Disp: 2 mL, Rfl: 1    Allergies   Allergen Reactions    Sulfamethoxazole-Trimethoprim        Physical Exam:   Vitals:    12/06/24 0831   BP: 142/99   Pulse: 94   Resp: 17   Temp: (!) 96.4 °F (35.8 °C)   SpO2: 96%     General: Awake, Alert, Oriented x 3, Mood and affect appropriate  Respiratory: Respirations even and unlabored  Cardiovascular: Peripheral pulses intact; no edema  Musculoskeletal Exam: TTP low back    ASA Score: 1    Patient/Chart Verification  Patient ID Verified: Verbal  ID Band Applied: No  Consents Confirmed: Procedural  H&P( within 30 days) Verified: To be obtained in the Procedural area  Allergies Reviewed: Yes  Anticoag/NSAID held?: NA  Currently on antibiotics?: No    Assessment:   1. Lumbar spondylosis        Plan: left L345 MBB #2      The risks of the procedure were discussed in detail.  These risks include infection, increased pain, paralysis, bleeding.  Patient understands the risks and is willing to pursue with the procedure

## 2024-12-16 ENCOUNTER — TELEPHONE (OUTPATIENT)
Dept: FAMILY MEDICINE CLINIC | Facility: CLINIC | Age: 44
End: 2024-12-16

## 2024-12-16 ENCOUNTER — NURSE TRIAGE (OUTPATIENT)
Age: 44
End: 2024-12-16

## 2024-12-16 NOTE — TELEPHONE ENCOUNTER
Regarding: Nasal congestion, sore throat  ----- Message from Tita SHAFER sent at 12/16/2024  9:45 AM EST -----  Patient called the refill line requesting that his PCP send in a script for Zpak. Patient states that he is currently having issues with nasal congestion and a sore throat. Please review

## 2024-12-16 NOTE — TELEPHONE ENCOUNTER
Patient calls requesting a Z pack sent in for congestion and sore throat.  Patient states he started with cold symptoms yesterday and today woke up with more congestion.  Denies fevers, SOB, chest pain or wheezing.        Please call patient when medication is sent or if there is a problem sending.

## 2024-12-16 NOTE — TELEPHONE ENCOUNTER
Patient called the RX Refill Line. Message is being forwarded to the office.     Patient is requesting a call back as soon as possible. Patient was informed of the message below stating that he should go to urgent care. Patient is very frustrated stating that  all he needs is an antibiotic and that it would cost him a $200 copay to go to Urgent care. He said that this getting ridiculous and would like to be contacted.     Please contact patient at [phone]

## 2024-12-16 NOTE — PROGRESS NOTES
Office Visit Note  24     Erik White 44 y.o. male MRN: [id number]  : 1980    Assessment:     1. Acute non-recurrent sinusitis of other sinus  Assessment & Plan:  Patient is sinus symptoms postnasal drip pain congestion for last 2 days throat appears mildly congested.  He has responded well to the Z-Kvng in the past.  Symptomatic treatment for now if symptoms persist will recommend Z-Kvng.  Orders:  -     azithromycin (ZITHROMAX) 250 mg tablet; Take 2 the first day, then take 1 daily for the next 4 days  2. [facility]  [facility]  3. Acute left-sided low back pain with left-sided sciatica  Assessment & Plan:  Patient had nerve block injection with the pain management physicians recently.  During the nerve block patient had relief but subsequently the pain came back.  Patient will be following with the pain management physician regarding burning of the nerve endings.             Discussion Summary and Plan:  Today's care plan and medications were reviewed with patient in detail and all their questions answered to their satisfaction.    Chief Complaint   Patient presents with    Cold Like Symptoms      Subjective:  Patient is coming here for evaluation regarding symptoms of sinus congestion postnasal drip pain no fever no cough symptoms going on for last 2 3 days time progressively increasing.  Few of his friends also sick with upper respiratory infection symptoms.  No loss of taste or smell no nausea vomiting or any other associated symptoms.        The following portions of the patient's history were reviewed and updated as appropriate: allergies, current medications, past family history, past medical history, past social history, past surgical history and problem list.    Review of Systems   Constitutional:  Negative for chills and fever.   HENT:  Positive for congestion and sinus pressure. Negative for ear pain and sore throat.    Eyes:  Negative for pain and visual disturbance.    Respiratory:  Negative for cough and shortness of breath.    Cardiovascular:  Negative for chest pain and palpitations.   Gastrointestinal:  Negative for abdominal pain and vomiting.   Genitourinary:  Negative for dysuria and hematuria.   Musculoskeletal:  Negative for arthralgias and back pain.   Skin:  Negative for color change and rash.   Neurological:  Negative for seizures and syncope.   All other systems reviewed and are negative.        Historical Information   Patient Active Problem List   Diagnosis    Atypical chest pain    Anxiety    Tachycardia    Chronic gout of multiple sites    Acute low back pain    Renal insufficiency    Fungal infection of nail    Obesity (BMI 35.0-39.9 without comorbidity)    Type 2 diabetes mellitus without complication, without long-term current use of insulin (HCC)    Elevated liver enzymes    Upper respiratory infection    Umbilical hernia without obstruction and without gangrene    Lumbar spondylosis    Other acute sinusitis     Past Medical History:   Diagnosis Date    Tachycardia      Past Surgical History:   Procedure Laterality Date    EYE SURGERY      HERNIA REPAIR       Social History     Substance and Sexual Activity   Alcohol Use Yes    Comment: occasional      Social History     Substance and Sexual Activity   Drug Use Never     Social History     Tobacco Use   Smoking Status Former    Types: Cigarettes    Passive exposure: Never   Smokeless Tobacco Never     Family History   Problem Relation Age of Onset    Heart attack Father     Heart attack Family      Health Maintenance Due   Topic    Pneumococcal Vaccine: Pediatrics (0 to 5 Years) and At-Risk Patients (6 to 64 Years) (1 of 2 - PCV)    HIV Screening     DTaP,Tdap,and Td Vaccines (1 - Tdap)    Colorectal Cancer Screening     Influenza Vaccine (1)    COVID-19 Vaccine (7 - 2024-25 season)    HEMOGLOBIN A1C     Depression Screening       Meds/Allergies       Current Outpatient Medications:     ALPRAZolam (XANAX)  "0.25 mg tablet, Take 1 tablet (0.25 mg total) by mouth daily at bedtime as needed for anxiety, Disp: 20 tablet, Rfl: 0    azithromycin (ZITHROMAX) 250 mg tablet, Take 2 the first day, then take 1 daily for the next 4 days, Disp: 6 tablet, Rfl: 0    febuxostat (ULORIC) 80 MG TABS, Take 1 tablet (80 mg total) by mouth daily, Disp: 90 tablet, Rfl: 1    sildenafil (VIAGRA) 100 mg tablet, Take 1 tablet (100 mg total) by mouth daily as needed for erectile dysfunction, Disp: 10 tablet, Rfl: 1    Tirzepatide (Mounjaro) 10 MG/0.5ML SOAJ, Inject 10 mg under the skin once a week, Disp: 2 mL, Rfl: 1    Multiple Vitamin (ONE-A-DAY MENS PO), Take by mouth (Patient not taking: Reported on 12/2/2024), Disp: , Rfl:       Objective:    Vitals:   /78 (BP Location: Right arm, Patient Position: Sitting, Cuff Size: Large)   Pulse 103   Temp 98.6 °F (37 °C)   Ht 6' 2\" (1.88 m)   Wt 122 kg (270 lb)   SpO2 96%   BMI 34.67 kg/m²   Body mass index is 34.67 kg/m².  Vitals:    12/17/24 1401   Weight: 122 kg (270 lb)       Physical Exam  Vitals and nursing note reviewed.   Constitutional:       Appearance: Normal appearance.   HENT:      Mouth/Throat:      Mouth: Mucous membranes are moist.   Cardiovascular:      Rate and Rhythm: Normal rate and regular rhythm.      Heart sounds: Normal heart sounds.   Pulmonary:      Effort: Pulmonary effort is normal.      Breath sounds: Normal breath sounds.   Musculoskeletal:      Right lower leg: No edema.      Left lower leg: No edema.   Skin:     General: Skin is warm and dry.   Neurological:      Mental Status: He is alert and oriented to person, place, and time.   Psychiatric:         Mood and Affect: Mood normal.         Behavior: Behavior normal.         Lab Review   No visits with results within 2 Month(s) from this visit.   Latest known visit with results is:   Appointment on 05/05/2024   Component Date Value Ref Range Status    WBC 05/05/2024 8.60  4.31 - 10.16 Thousand/uL Final    RBC " 05/05/2024 4.89  3.88 - 5.62 Million/uL Final    Hemoglobin 05/05/2024 15.0  12.0 - 17.0 g/dL Final    Hematocrit 05/05/2024 44.6  36.5 - 49.3 % Final    MCV 05/05/2024 91  82 - 98 fL Final    MCH 05/05/2024 30.7  26.8 - 34.3 pg Final    MCHC 05/05/2024 33.6  31.4 - 37.4 g/dL Final    RDW 05/05/2024 12.2  11.6 - 15.1 % Final    MPV 05/05/2024 11.6  8.9 - 12.7 fL Final    Platelets 05/05/2024 187  149 - 390 Thousands/uL Final    nRBC 05/05/2024 0  /100 WBCs Final    Segmented % 05/05/2024 53  43 - 75 % Final    Immature Grans % 05/05/2024 1  0 - 2 % Final    Lymphocytes % 05/05/2024 34  14 - 44 % Final    Monocytes % 05/05/2024 10  4 - 12 % Final    Eosinophils Relative 05/05/2024 1  0 - 6 % Final    Basophils Relative 05/05/2024 1  0 - 1 % Final    Absolute Neutrophils 05/05/2024 4.64  1.85 - 7.62 Thousands/µL Final    Absolute Immature Grans 05/05/2024 0.06  0.00 - 0.20 Thousand/uL Final    Absolute Lymphocytes 05/05/2024 2.96  0.60 - 4.47 Thousands/µL Final    Absolute Monocytes 05/05/2024 0.82  0.17 - 1.22 Thousand/µL Final    Eosinophils Absolute 05/05/2024 0.08  0.00 - 0.61 Thousand/µL Final    Basophils Absolute 05/05/2024 0.04  0.00 - 0.10 Thousands/µL Final    Sodium 05/05/2024 139  135 - 147 mmol/L Final    Potassium 05/05/2024 4.6  3.5 - 5.3 mmol/L Final    Chloride 05/05/2024 103  96 - 108 mmol/L Final    CO2 05/05/2024 30  21 - 32 mmol/L Final    ANION GAP 05/05/2024 6  4 - 13 mmol/L Final    BUN 05/05/2024 20  5 - 25 mg/dL Final    Creatinine 05/05/2024 1.44 (H)  0.60 - 1.30 mg/dL Final    Standardized to IDMS reference method    Glucose, Fasting 05/05/2024 102 (H)  65 - 99 mg/dL Final    Calcium 05/05/2024 9.4  8.4 - 10.2 mg/dL Final    AST 05/05/2024 23  13 - 39 U/L Final    ALT 05/05/2024 31  7 - 52 U/L Final    Specimen collection should occur prior to Sulfasalazine administration due to the potential for falsely depressed results.     Alkaline Phosphatase 05/05/2024 70  34 - 104 U/L Final    Total  Protein 05/05/2024 6.8  6.4 - 8.4 g/dL Final    Albumin 05/05/2024 4.6  3.5 - 5.0 g/dL Final    Total Bilirubin 05/05/2024 0.56  0.20 - 1.00 mg/dL Final    Use of this assay is not recommended for patients undergoing treatment with eltrombopag due to the potential for falsely elevated results.  N-acetyl-p-benzoquinone imine (metabolite of Acetaminophen) will generate erroneously low results in samples for patients that have taken an overdose of Acetaminophen.    eGFR 05/05/2024 58  ml/min/1.73sq m Final    Hemoglobin A1C 05/05/2024 5.6  Normal 4.0-5.6%; PreDiabetic 5.7-6.4%; Diabetic >=6.5%; Glycemic control for adults with diabetes <7.0% % Final    EAG 05/05/2024 114  mg/dl Final    Cholesterol 05/05/2024 181  See Comment mg/dL Final    Cholesterol:         Pediatric <18 Years        Desirable          <170 mg/dL      Borderline High    170-199 mg/dL      High               >=200 mg/dL        Adult >=18 Years            Desirable         <200 mg/dL      Borderline High   200-239 mg/dL      High              >239 mg/dL      Triglycerides 05/05/2024 121  See Comment mg/dL Final    Triglyceride:     0-9Y            <75mg/dL     10Y-17Y         <90 mg/dL       >=18Y     Normal          <150 mg/dL     Borderline High 150-199 mg/dL     High            200-499 mg/dL        Very High       >499 mg/dL    Specimen collection should occur prior to Metamizole administration due to the potential for falsely depressed results.    HDL, Direct 05/05/2024 49  >=40 mg/dL Final    LDL Calculated 05/05/2024 108 (H)  0 - 100 mg/dL Final    LDL Cholesterol:     Optimal           <100 mg/dl     Near Optimal      100-129 mg/dl     Above Optimal       Borderline High 130-159 mg/dl       High            160-189 mg/dl       Very High       >189 mg/dl         This screening LDL is a calculated result.   It does not have the accuracy of the Direct Measured LDL in the monitoring of patients with hyperlipidemia and/or statin therapy.   Direct  "Measure LDL (STN267) must be ordered separately in these patients.    Non-HDL-Chol (CHOL-HDL) 05/05/2024 132  mg/dl Final    TSH 3RD GENERATON 05/05/2024 1.556  0.450 - 4.500 uIU/mL Final    Adult TSH (3rd generation) reference range follows the recommended guidelines of the American Thyroid Association, January, 2020.    Color, UA 05/05/2024 Light Yellow   Final    Clarity, UA 05/05/2024 Clear   Final    Specific Gravity, UA 05/05/2024 1.020  1.003 - 1.030 Final    pH, UA 05/05/2024 5.0  4.5, 5.0, 5.5, 6.0, 6.5, 7.0, 7.5, 8.0 Final    Leukocytes, UA 05/05/2024 Negative  Negative Final    Nitrite, UA 05/05/2024 Negative  Negative Final    Protein, UA 05/05/2024 Negative  Negative mg/dl Final    Glucose, UA 05/05/2024 Negative  Negative mg/dl Final    Ketones, UA 05/05/2024 Negative  Negative mg/dl Final    Urobilinogen, UA 05/05/2024 <2.0  <2.0 mg/dl mg/dl Final    Bilirubin, UA 05/05/2024 Negative  Negative Final    Occult Blood, UA 05/05/2024 Negative  Negative Final    Uric Acid 05/05/2024 5.7  3.5 - 8.5 mg/dL Final    Specimen collection should occur prior to Metamizole administration due to the potential for falsely depressed results.    Creatinine, Ur 05/05/2024 154.6  Reference range not established. mg/dL Final    Albumin,U,Random 05/05/2024 11.2  <20.0 mg/L Final    Albumin Creat Ratio 05/05/2024 7  0 - 30 mg/g creatinine Final         Sylvester Mora MD        \"This note has been constructed using a voice recognition system.Therefore there may be syntax, spelling, and/or grammatical errors. Please call if you have any questions. \"  "

## 2024-12-16 NOTE — TELEPHONE ENCOUNTER
"Reason for Disposition  • Colds with no complications    Answer Assessment - Initial Assessment Questions  1. ONSET: \"When did the nasal discharge start?\"            Yesterday and worse today       2. AMOUNT: \"How much discharge is there?\"         ,moderate    3. COUGH: \"Do you have a cough?\" If Yes, ask: \"Describe the color of your mucus.\" (e.g., clear, white, yellow, green)        mild    4. RESPIRATORY DISTRESS: \"Describe your breathing.\"           Denies    5. FEVER: \"Do you have a fever?\" If Yes, ask: \"What is your temperature, how was it measured, and when did it start?\"          Denies                  7. OTHER SYMPTOMS: \"Do you have any other symptoms?\" (e.g., earache, mouth sores, sore throat, wheezing)         Sore throat    Protocols used: Common Cold-Adult-[place]    "

## 2024-12-17 ENCOUNTER — OFFICE VISIT (OUTPATIENT)
Dept: FAMILY MEDICINE CLINIC | Facility: CLINIC | Age: 44
End: 2024-12-17
Payer: COMMERCIAL

## 2024-12-17 VITALS
SYSTOLIC BLOOD PRESSURE: 130 MMHG | HEIGHT: 74 IN | BODY MASS INDEX: 34.65 KG/M2 | OXYGEN SATURATION: 96 % | WEIGHT: 270 LBS | DIASTOLIC BLOOD PRESSURE: 78 MMHG | TEMPERATURE: 98.6 F | HEART RATE: 103 BPM

## 2024-12-17 DIAGNOSIS — J01.80 ACUTE NON-RECURRENT SINUSITIS OF OTHER SINUS: Primary | ICD-10-CM

## 2024-12-17 DIAGNOSIS — Z12.11 SCREENING FOR COLON CANCER: ICD-10-CM

## 2024-12-17 DIAGNOSIS — M54.42 ACUTE LEFT-SIDED LOW BACK PAIN WITH LEFT-SIDED SCIATICA: ICD-10-CM

## 2024-12-17 PROCEDURE — 99213 OFFICE O/P EST LOW 20 MIN: CPT | Performed by: INTERNAL MEDICINE

## 2024-12-17 RX ORDER — AZITHROMYCIN 250 MG/1
500 TABLET, FILM COATED ORAL EVERY 24 HOURS
Qty: 60 TABLET | Status: CANCELLED | OUTPATIENT
Start: 2024-12-17

## 2024-12-17 RX ORDER — AZITHROMYCIN 250 MG/1
TABLET, FILM COATED ORAL
Qty: 6 TABLET | Refills: 0 | Status: SHIPPED | OUTPATIENT
Start: 2024-12-17 | End: 2024-12-22

## 2024-12-17 NOTE — ASSESSMENT & PLAN NOTE
Patient is sinus symptoms postnasal drip pain congestion for last 2 days throat appears mildly congested.  He has responded well to the Z-Kvng in the past.  Symptomatic treatment for now if symptoms persist will recommend Z-Kvng.

## 2024-12-17 NOTE — ASSESSMENT & PLAN NOTE
Patient had nerve block injection with the pain management physicians recently.  During the nerve block patient had relief but subsequently the pain came back.  Patient will be following with the pain management physician regarding burning of the nerve endings.

## 2024-12-22 DIAGNOSIS — M47.816 LUMBAR SPONDYLOSIS: Primary | ICD-10-CM

## 2024-12-22 NOTE — PROGRESS NOTES
80% reduction in pain after two MBB   Will schedule left L345 MBRFA    The risks of the procedure were discussed in detail.  These risks include infection, increased pain, paralysis, bleeding.  Patient understands the risks and is willing to pursue with the procedure

## 2024-12-26 DIAGNOSIS — E11.9 TYPE 2 DIABETES MELLITUS WITHOUT COMPLICATION, WITHOUT LONG-TERM CURRENT USE OF INSULIN (HCC): ICD-10-CM

## 2024-12-26 DIAGNOSIS — E66.9 OBESITY (BMI 35.0-39.9 WITHOUT COMORBIDITY): ICD-10-CM

## 2024-12-29 RX ORDER — TIRZEPATIDE 10 MG/.5ML
10 INJECTION, SOLUTION SUBCUTANEOUS WEEKLY
Qty: 2 ML | Refills: 0 | Status: SHIPPED | OUTPATIENT
Start: 2024-12-29

## 2024-12-31 ENCOUNTER — APPOINTMENT (OUTPATIENT)
Dept: LAB | Facility: CLINIC | Age: 44
End: 2024-12-31
Payer: COMMERCIAL

## 2024-12-31 DIAGNOSIS — R73.03 PREDIABETES: ICD-10-CM

## 2024-12-31 DIAGNOSIS — E11.9 TYPE 2 DIABETES MELLITUS WITHOUT COMPLICATION, WITHOUT LONG-TERM CURRENT USE OF INSULIN (HCC): ICD-10-CM

## 2024-12-31 DIAGNOSIS — M1A.09X0 CHRONIC GOUT OF MULTIPLE SITES, UNSPECIFIED CAUSE: ICD-10-CM

## 2024-12-31 DIAGNOSIS — E78.5 DYSLIPIDEMIA: ICD-10-CM

## 2024-12-31 LAB
ALBUMIN SERPL BCG-MCNC: 4.4 G/DL (ref 3.5–5)
ALP SERPL-CCNC: 68 U/L (ref 34–104)
ALT SERPL W P-5'-P-CCNC: 34 U/L (ref 7–52)
ANION GAP SERPL CALCULATED.3IONS-SCNC: 5 MMOL/L (ref 4–13)
AST SERPL W P-5'-P-CCNC: 31 U/L (ref 13–39)
BILIRUB SERPL-MCNC: 0.56 MG/DL (ref 0.2–1)
BILIRUB UR QL STRIP: NEGATIVE
BUN SERPL-MCNC: 15 MG/DL (ref 5–25)
CALCIUM SERPL-MCNC: 9.2 MG/DL (ref 8.4–10.2)
CHLORIDE SERPL-SCNC: 108 MMOL/L (ref 96–108)
CHOLEST SERPL-MCNC: 168 MG/DL (ref ?–200)
CLARITY UR: CLEAR
CO2 SERPL-SCNC: 30 MMOL/L (ref 21–32)
COLOR UR: YELLOW
CREAT SERPL-MCNC: 1.25 MG/DL (ref 0.6–1.3)
CREAT UR-MCNC: 231.3 MG/DL
EST. AVERAGE GLUCOSE BLD GHB EST-MCNC: 120 MG/DL
GFR SERPL CREATININE-BSD FRML MDRD: 68 ML/MIN/1.73SQ M
GLUCOSE P FAST SERPL-MCNC: 110 MG/DL (ref 65–99)
GLUCOSE UR STRIP-MCNC: NEGATIVE MG/DL
HBA1C MFR BLD: 5.8 %
HDLC SERPL-MCNC: 47 MG/DL
HGB UR QL STRIP.AUTO: NEGATIVE
KETONES UR STRIP-MCNC: NEGATIVE MG/DL
LDLC SERPL CALC-MCNC: 102 MG/DL (ref 0–100)
LEUKOCYTE ESTERASE UR QL STRIP: NEGATIVE
MICROALBUMIN UR-MCNC: 17.6 MG/L
MICROALBUMIN/CREAT 24H UR: 8 MG/G CREATININE (ref 0–30)
NITRITE UR QL STRIP: NEGATIVE
NONHDLC SERPL-MCNC: 121 MG/DL
PH UR STRIP.AUTO: 5.5 [PH]
POTASSIUM SERPL-SCNC: 4.1 MMOL/L (ref 3.5–5.3)
PROT SERPL-MCNC: 6.1 G/DL (ref 6.4–8.4)
PROT UR STRIP-MCNC: NEGATIVE MG/DL
SODIUM SERPL-SCNC: 143 MMOL/L (ref 135–147)
SP GR UR STRIP.AUTO: 1.03 (ref 1–1.03)
TRIGL SERPL-MCNC: 96 MG/DL (ref ?–150)
URATE SERPL-MCNC: 4.7 MG/DL (ref 3.5–8.5)
UROBILINOGEN UR STRIP-ACNC: <2 MG/DL

## 2024-12-31 PROCEDURE — 80061 LIPID PANEL: CPT

## 2024-12-31 PROCEDURE — 84550 ASSAY OF BLOOD/URIC ACID: CPT

## 2024-12-31 PROCEDURE — 83036 HEMOGLOBIN GLYCOSYLATED A1C: CPT

## 2024-12-31 PROCEDURE — 80053 COMPREHEN METABOLIC PANEL: CPT

## 2024-12-31 PROCEDURE — 36415 COLL VENOUS BLD VENIPUNCTURE: CPT

## 2024-12-31 PROCEDURE — 82570 ASSAY OF URINE CREATININE: CPT

## 2024-12-31 PROCEDURE — 82043 UR ALBUMIN QUANTITATIVE: CPT

## 2024-12-31 PROCEDURE — 81003 URINALYSIS AUTO W/O SCOPE: CPT

## 2025-01-01 ENCOUNTER — RESULTS FOLLOW-UP (OUTPATIENT)
Dept: FAMILY MEDICINE CLINIC | Facility: CLINIC | Age: 45
End: 2025-01-01

## 2025-01-03 ENCOUNTER — TELEPHONE (OUTPATIENT)
Dept: RADIOLOGY | Facility: HOSPITAL | Age: 45
End: 2025-01-03

## 2025-01-03 ENCOUNTER — NURSE TRIAGE (OUTPATIENT)
Dept: PAIN MEDICINE | Facility: CLINIC | Age: 45
End: 2025-01-03

## 2025-01-03 NOTE — TELEPHONE ENCOUNTER
Please assist    Erik White to [facility] (supporting Spencer Garcia MD)         1/3/25  6:05 AM  I received a call about and appointment around [facility]. Unfortunately nothing is scheduled.

## 2025-01-03 NOTE — TELEPHONE ENCOUNTER
Caller: patient    Doctor: AR    Reason for call: patient is calling to schedule a procedure    Call back#:

## 2025-01-03 NOTE — TELEPHONE ENCOUNTER
Caller: Patient    Doctor/Office: Jose    Call regarding :  returned call     Call was transferred to: spa

## 2025-01-06 NOTE — TELEPHONE ENCOUNTER
Caller: doris Valencia     Doctor: Dr. WOODS     Reason for call: pt would like to be scheduled for injection. Please Advise     Call back#: [phone]

## 2025-01-07 NOTE — TELEPHONE ENCOUNTER
Pt will be out 1/21-1/23 and then out again after 1/27. Will like to get procedure done before he moves. Will check ins and call pt back to try and get him in

## 2025-01-07 NOTE — TELEPHONE ENCOUNTER
Caller: Pt    Doctor: Dr WOODS    Reason for call: Pt confirmed appt for 1/9/2025. Pt has questions regarding procedure,     Please assist.     Call back#: [phone]

## 2025-01-09 ENCOUNTER — TELEPHONE (OUTPATIENT)
Dept: PAIN MEDICINE | Facility: CLINIC | Age: 45
End: 2025-01-09

## 2025-01-09 ENCOUNTER — HOSPITAL ENCOUNTER (OUTPATIENT)
Dept: RADIOLOGY | Facility: HOSPITAL | Age: 45
Discharge: HOME | End: 2025-01-09
Attending: PAIN MEDICINE | Admitting: PAIN MEDICINE
Payer: COMMERCIAL

## 2025-01-09 VITALS
SYSTOLIC BLOOD PRESSURE: 137 MMHG | DIASTOLIC BLOOD PRESSURE: 93 MMHG | HEART RATE: 74 BPM | TEMPERATURE: 96.5 F | OXYGEN SATURATION: 99 % | RESPIRATION RATE: 17 BRPM

## 2025-01-09 DIAGNOSIS — M47.816 LUMBAR SPONDYLOSIS: ICD-10-CM

## 2025-01-09 PROCEDURE — 64636 DESTROY L/S FACET JNT ADDL: CPT | Performed by: PAIN MEDICINE

## 2025-01-09 PROCEDURE — 64635 DESTROY LUMB/SAC FACET JNT: CPT | Performed by: PAIN MEDICINE

## 2025-01-09 RX ORDER — LIDOCAINE HYDROCHLORIDE 10 MG/ML
20 INJECTION, SOLUTION EPIDURAL; INFILTRATION; INTRACAUDAL; PERINEURAL ONCE
Status: COMPLETED | OUTPATIENT
Start: 2025-01-09 | End: 2025-01-09

## 2025-01-09 RX ORDER — BUPIVACAINE HCL/PF 2.5 MG/ML
5 VIAL (ML) INJECTION ONCE
Status: COMPLETED | OUTPATIENT
Start: 2025-01-09 | End: 2025-01-09

## 2025-01-09 RX ORDER — METHYLPREDNISOLONE ACETATE 40 MG/ML
40 INJECTION, SUSPENSION INTRA-ARTICULAR; INTRALESIONAL; INTRAMUSCULAR; PARENTERAL; SOFT TISSUE ONCE
Status: COMPLETED | OUTPATIENT
Start: 2025-01-09 | End: 2025-01-09

## 2025-01-09 RX ADMIN — LIDOCAINE HYDROCHLORIDE 20 ML: 10 INJECTION, SOLUTION EPIDURAL; INFILTRATION; INTRACAUDAL; PERINEURAL at 14:44

## 2025-01-09 RX ADMIN — METHYLPREDNISOLONE ACETATE 40 MG: 40 INJECTION, SUSPENSION INTRA-ARTICULAR; INTRALESIONAL; INTRAMUSCULAR; SOFT TISSUE at 14:58

## 2025-01-09 RX ADMIN — LIDOCAINE HYDROCHLORIDE 3 ML: 20 INJECTION, SOLUTION EPIDURAL; INFILTRATION; INTRACAUDAL; PERINEURAL at 14:51

## 2025-01-09 RX ADMIN — BUPIVACAINE HYDROCHLORIDE 5 ML: 2.5 INJECTION, SOLUTION EPIDURAL; INFILTRATION; INTRACAUDAL at 14:58

## 2025-01-09 NOTE — H&P
History of Present Illness: The patient is a 44 y.o. male who presents with complaints of low back pain    Past Medical History:   Diagnosis Date    Tachycardia        Past Surgical History:   Procedure Laterality Date    EYE SURGERY      HERNIA REPAIR           Current Outpatient Medications:     ALPRAZolam (XANAX) 0.25 mg tablet, Take 1 tablet (0.25 mg total) by mouth daily at bedtime as needed for anxiety, Disp: 20 tablet, Rfl: 0    febuxostat (ULORIC) 80 MG TABS, Take 1 tablet (80 mg total) by mouth daily, Disp: 90 tablet, Rfl: 1    Multiple Vitamin (ONE-A-DAY MENS PO), Take by mouth (Patient not taking: Reported on 12/2/2024), Disp: , Rfl:     sildenafil (VIAGRA) 100 mg tablet, Take 1 tablet (100 mg total) by mouth daily as needed for erectile dysfunction, Disp: 10 tablet, Rfl: 1    Tirzepatide (Mounjaro) 10 MG/0.5ML SOAJ, Inject 10 mg under the skin once a week, Disp: 2 mL, Rfl: 0    Allergies   Allergen Reactions    Sulfamethoxazole-Trimethoprim        Physical Exam: There were no vitals filed for this visit.  General: Awake, Alert, Oriented x 3, Mood and affect appropriate  Respiratory: Respirations even and unlabored  Cardiovascular: Peripheral pulses intact; no edema  Musculoskeletal Exam: Ttp left side    ASA Score: 2       Assessment:   1. Lumbar spondylosis        Plan: Left L345 Medial branch radiofrequency ablation      The risks of the procedure were discussed in detail.  These risks include infection, increased pain, paralysis, bleeding.  Patient understands the risks and is willing to pursue with the procedure

## 2025-01-09 NOTE — DISCHARGE INSTR - LAB

## 2025-01-10 NOTE — TELEPHONE ENCOUNTER
"S/W pt s/p Left L3-4, 4-5 RFA.  Pt reports \"feels great\", slight irritation 1/10.  Pt happy with results at this time.  No s/s at needle sites.  Pt aware can take 4-6 weeks for full results.  Pt Moving and leaves for [place] on 1//20/2025.  Did not want to schedule F/U at this time due to moving, but will call if anything changes and will be in touch when established at new residence.  "

## 2025-01-16 ENCOUNTER — OFFICE VISIT (OUTPATIENT)
Dept: FAMILY MEDICINE CLINIC | Facility: CLINIC | Age: 45
End: 2025-01-16
Payer: COMMERCIAL

## 2025-01-16 VITALS
HEIGHT: 74 IN | WEIGHT: 263 LBS | DIASTOLIC BLOOD PRESSURE: 80 MMHG | BODY MASS INDEX: 33.75 KG/M2 | HEART RATE: 94 BPM | SYSTOLIC BLOOD PRESSURE: 121 MMHG | OXYGEN SATURATION: 95 %

## 2025-01-16 DIAGNOSIS — F41.9 ANXIETY: ICD-10-CM

## 2025-01-16 DIAGNOSIS — E66.811 OBESITY (BMI 30.0-34.9): ICD-10-CM

## 2025-01-16 DIAGNOSIS — M1A.09X0 CHRONIC GOUT OF MULTIPLE SITES, UNSPECIFIED CAUSE: ICD-10-CM

## 2025-01-16 DIAGNOSIS — E11.9 TYPE 2 DIABETES MELLITUS WITHOUT COMPLICATION, WITHOUT LONG-TERM CURRENT USE OF INSULIN (HCC): ICD-10-CM

## 2025-01-16 DIAGNOSIS — N28.9 RENAL INSUFFICIENCY: ICD-10-CM

## 2025-01-16 DIAGNOSIS — M54.42 ACUTE LEFT-SIDED LOW BACK PAIN WITH LEFT-SIDED SCIATICA: Primary | ICD-10-CM

## 2025-01-16 PROBLEM — J01.80 OTHER ACUTE SINUSITIS: Status: RESOLVED | Noted: 2024-12-17 | Resolved: 2025-01-16

## 2025-01-16 PROCEDURE — 99214 OFFICE O/P EST MOD 30 MIN: CPT | Performed by: INTERNAL MEDICINE

## 2025-01-16 RX ORDER — FEBUXOSTAT 80 MG/1
80 TABLET, FILM COATED ORAL DAILY
Qty: 90 TABLET | Refills: 1 | Status: CANCELLED | OUTPATIENT
Start: 2025-01-16

## 2025-01-16 RX ORDER — TIRZEPATIDE 10 MG/.5ML
10 INJECTION, SOLUTION SUBCUTANEOUS WEEKLY
Qty: 2 ML | Refills: 0 | Status: SHIPPED | OUTPATIENT
Start: 2025-01-16

## 2025-01-16 NOTE — ASSESSMENT & PLAN NOTE
Patient had lost total of 55 pounds in last 1 year time currently taking Mounjaro 10 mg weekly appears to be helping with the weight and also controlling his blood sugars.  Will continue with the same for now.

## 2025-01-16 NOTE — ASSESSMENT & PLAN NOTE
Uric acid level came down at 4.7 currently patient is taking Uloric 80 mg daily continue with the same no episodes of any gout attacks in the recent past.

## 2025-01-16 NOTE — PROGRESS NOTES
Office Visit Note  25     Erik White 44 y.o. male MRN: [id number]  : 1980    Assessment:     1. Acute left-sided low back pain with left-sided sciatica  Assessment & Plan:  Patient is being followed by the [facility] in [place] was seen on  and had left L3-L4-L5 medial branch radiofrequency ablation to which patient has responded very well pain is much better compared to before.  Lumbar spine movements are also better compared to before continue to monitor follow-up with the pain management.  2. Anxiety  Assessment & Plan:  Patient with history of anxiety occasionally takes Xanax will continue with the same for now he is also being followed by the psychologist.  3. Type 2 diabetes mellitus without complication, without long-term current use of insulin ([facility])  Assessment & Plan:    Lab Results   Component Value Date    HGBA1C 5.8 (H) 2024   Patient hemoglobin A1c is at 5.8 it was 5.6 before currently patient is on Mounjaro 10 mg subcutaneously weekly  Orders:  -     Tirzepatide (Mounjaro) 10 MG/0.5ML SOAJ; Inject 10 mg under the skin once a week  4. Chronic gout of multiple sites, unspecified cause  Assessment & Plan:  Uric acid level came down at 4.7 currently patient is taking Uloric 80 mg daily continue with the same no episodes of any gout attacks in the recent past.  5. Renal insufficiency  Assessment & Plan:  Creatinine level came down to 1.25 GFR is at 88 better compared to before we will continue to monitor with periodic labs rest of the chemistry is unremarkable  6. Obesity (BMI 30.0-34.9)  Assessment & Plan:  Patient had lost total of 55 pounds in last 1 year time currently taking Mounjaro 10 mg weekly appears to be helping with the weight and also controlling his blood sugars.  Will continue with the same for now.             Discussion Summary and Plan:  Today's care plan and medications were reviewed with patient in detail and all their questions  answered to their satisfaction.    Chief Complaint   Patient presents with    Follow-up      Subjective:  Patient is coming here for a follow-up evaluation with regards to his symptoms of diabetes, gout, low back pain anxiety.  Patient recently had URI symptoms resolved with medications.  He was also seen by the pain management physician and had a radiofrequency ablation done.  Much better with the low back pain.    Medications reviewed labs reviewed.  .        The following portions of the patient's history were reviewed and updated as appropriate: allergies, current medications, past family history, past medical history, past social history, past surgical history and problem list.    Review of Systems   Constitutional:  Negative for chills and fever.   HENT:  Negative for ear pain and sore throat.    Eyes:  Negative for pain and visual disturbance.   Respiratory:  Negative for cough and shortness of breath.    Cardiovascular:  Negative for chest pain and palpitations.   Gastrointestinal:  Negative for abdominal pain and vomiting.   Genitourinary:  Negative for dysuria and hematuria.   Musculoskeletal:  Positive for arthralgias and back pain.   Skin:  Negative for color change and rash.   Neurological:  Negative for seizures and syncope.   All other systems reviewed and are negative.        Historical Information   Patient Active Problem List   Diagnosis    Atypical chest pain    Anxiety    Tachycardia    Chronic gout of multiple sites    Acute low back pain    Renal insufficiency    Fungal infection of nail    Obesity (BMI 35.0-39.9 without comorbidity)    Type 2 diabetes mellitus without complication, without long-term current use of insulin (HCC)    Elevated liver enzymes    Upper respiratory infection    Umbilical hernia without obstruction and without gangrene    Lumbar spondylosis    Other acute sinusitis    Obesity (BMI 30.0-34.9)     Past Medical History:   Diagnosis Date    Tachycardia      Past Surgical  "History:   Procedure Laterality Date    EYE SURGERY      HERNIA REPAIR       Social History     Substance and Sexual Activity   Alcohol Use Yes    Comment: occasional      Social History     Substance and Sexual Activity   Drug Use Never     Social History     Tobacco Use   Smoking Status Former    Types: Cigarettes    Passive exposure: Never   Smokeless Tobacco Never     Family History   Problem Relation Age of Onset    Heart attack Father     Heart attack Family      Health Maintenance Due   Topic    Pneumococcal Vaccine: Pediatrics (0 to 5 Years) and At-Risk Patients (6 to 64 Years) (1 of 2 - PCV)    HIV Screening     DTaP,Tdap,and Td Vaccines (1 - Tdap)    Colorectal Cancer Screening     Influenza Vaccine (1)    COVID-19 Vaccine (7 - 2024-25 season)    Depression Screening     Diabetic Foot Exam       Meds/Allergies       Current Outpatient Medications:     ALPRAZolam (XANAX) 0.25 mg tablet, Take 1 tablet (0.25 mg total) by mouth daily at bedtime as needed for anxiety, Disp: 20 tablet, Rfl: 0    febuxostat (ULORIC) 80 MG TABS, Take 1 tablet (80 mg total) by mouth daily, Disp: 90 tablet, Rfl: 1    sildenafil (VIAGRA) 100 mg tablet, Take 1 tablet (100 mg total) by mouth daily as needed for erectile dysfunction, Disp: 10 tablet, Rfl: 1    Tirzepatide (Mounjaro) 10 MG/0.5ML SOAJ, Inject 10 mg under the skin once a week, Disp: 2 mL, Rfl: 0      Objective:    Vitals:   /80 (BP Location: Right arm, Patient Position: Sitting, Cuff Size: Standard)   Pulse 94   Ht 6' 2\" (1.88 m)   Wt 119 kg (263 lb)   SpO2 95%   BMI 33.77 kg/m²   Body mass index is 33.77 kg/m².  Vitals:    01/16/25 1244   Weight: 119 kg (263 lb)       Physical Exam  Vitals and nursing note reviewed.   Constitutional:       Appearance: Normal appearance.   Cardiovascular:      Rate and Rhythm: Normal rate and regular rhythm.      Heart sounds: Normal heart sounds.   Pulmonary:      Effort: Pulmonary effort is normal.      Breath sounds: Normal " breath sounds.   Abdominal:      Palpations: Abdomen is soft.   Musculoskeletal:      Right lower leg: No edema.      Left lower leg: No edema.   Skin:     General: Skin is warm and dry.   Neurological:      General: No focal deficit present.      Mental Status: He is alert and oriented to person, place, and time.   Psychiatric:         Mood and Affect: Mood normal.         Behavior: Behavior normal.         Lab Review   Appointment on 12/31/2024   Component Date Value Ref Range Status    Sodium 12/31/2024 143  135 - 147 mmol/L Final    Potassium 12/31/2024 4.1  3.5 - 5.3 mmol/L Final    Chloride 12/31/2024 108  96 - 108 mmol/L Final    CO2 12/31/2024 30  21 - 32 mmol/L Final    ANION GAP 12/31/2024 5  4 - 13 mmol/L Final    BUN 12/31/2024 15  5 - 25 mg/dL Final    Creatinine 12/31/2024 1.25  0.60 - 1.30 mg/dL Final    Standardized to IDMS reference method    Glucose, Fasting 12/31/2024 110 (H)  65 - 99 mg/dL Final    Calcium 12/31/2024 9.2  8.4 - 10.2 mg/dL Final    AST 12/31/2024 31  13 - 39 U/L Final    ALT 12/31/2024 34  7 - 52 U/L Final    Specimen collection should occur prior to Sulfasalazine administration due to the potential for falsely depressed results.     Alkaline Phosphatase 12/31/2024 68  34 - 104 U/L Final    Total Protein 12/31/2024 6.1 (L)  6.4 - 8.4 g/dL Final    Albumin 12/31/2024 4.4  3.5 - 5.0 g/dL Final    Total Bilirubin 12/31/2024 0.56  0.20 - 1.00 mg/dL Final    Use of this assay is not recommended for patients undergoing treatment with eltrombopag due to the potential for falsely elevated results.  N-acetyl-p-benzoquinone imine (metabolite of Acetaminophen) will generate erroneously low results in samples for patients that have taken an overdose of Acetaminophen.    eGFR 12/31/2024 68  ml/min/1.73sq m Final    Cholesterol 12/31/2024 168  See Comment mg/dL Final    Cholesterol:         Pediatric <18 Years        Desirable          <170 mg/dL      Borderline High    170-199 mg/dL      High                >=200 mg/dL        Adult >=18 Years            Desirable         <200 mg/dL      Borderline High   200-239 mg/dL      High              >239 mg/dL      Triglycerides 12/31/2024 96  See Comment mg/dL Final    Triglyceride:     0-9Y            <75mg/dL     10Y-17Y         <90 mg/dL       >=18Y     Normal          <150 mg/dL     Borderline High 150-199 mg/dL     High            200-499 mg/dL        Very High       >499 mg/dL    Specimen collection should occur prior to Metamizole administration due to the potential for falsely depressed results.    HDL, Direct 12/31/2024 47  >=40 mg/dL Final    LDL Calculated 12/31/2024 102 (H)  0 - 100 mg/dL Final    LDL Cholesterol:     Optimal           <100 mg/dl     Near Optimal      100-129 mg/dl     Above Optimal       Borderline High 130-159 mg/dl       High            160-189 mg/dl       Very High       >189 mg/dl         This screening LDL is a calculated result.   It does not have the accuracy of the Direct Measured LDL in the monitoring of patients with hyperlipidemia and/or statin therapy.   Direct Measure LDL (SEJ744) must be ordered separately in these patients.    Non-HDL-Chol (CHOL-HDL) 12/31/2024 121  mg/dl Final    Creatinine, Ur 12/31/2024 231.3  Reference range not established. mg/dL Final    Albumin,U,Random 12/31/2024 17.6  <20.0 mg/L Final    Albumin Creat Ratio 12/31/2024 8  0 - 30 mg/g creatinine Final    Color, UA 12/31/2024 Yellow   Final    Clarity, UA 12/31/2024 Clear   Final    Specific Gravity, UA 12/31/2024 1.029  1.003 - 1.030 Final    pH, UA 12/31/2024 5.5  4.5, 5.0, 5.5, 6.0, 6.5, 7.0, 7.5, 8.0 Final    Leukocytes, UA 12/31/2024 Negative  Negative Final    Nitrite, UA 12/31/2024 Negative  Negative Final    Protein, UA 12/31/2024 Negative  Negative mg/dl Final    Glucose, UA 12/31/2024 Negative  Negative mg/dl Final    Ketones, UA 12/31/2024 Negative  Negative mg/dl Final    Urobilinogen, UA 12/31/2024 <2.0  <2.0 mg/dl mg/dl Final     "Bilirubin, UA 12/31/2024 Negative  Negative Final    Occult Blood, UA 12/31/2024 Negative  Negative Final    Hemoglobin A1C 12/31/2024 5.8 (H)  Normal 4.0-5.6%; PreDiabetic 5.7-6.4%; Diabetic >=6.5%; Glycemic control for adults with diabetes <7.0% % Final    EAG 12/31/2024 120  mg/dl Final    Uric Acid 12/31/2024 4.7  3.5 - 8.5 mg/dL Final    Specimen collection should occur prior to Metamizole administration due to the potential for falsely depressed results.         Sylvester Mora MD        \"This note has been constructed using a voice recognition system.Therefore there may be syntax, spelling, and/or grammatical errors. Please call if you have any questions. \"  "

## 2025-01-16 NOTE — ASSESSMENT & PLAN NOTE
Patient is being followed by the [facility] in [place] was seen on January 9 and had left L3-L4-L5 medial branch radiofrequency ablation to which patient has responded very well pain is much better compared to before.  Lumbar spine movements are also better compared to before continue to monitor follow-up with the pain management.

## 2025-01-16 NOTE — ASSESSMENT & PLAN NOTE
Patient with history of anxiety occasionally takes Xanax will continue with the same for now he is also being followed by the psychologist.

## 2025-01-16 NOTE — ASSESSMENT & PLAN NOTE
Creatinine level came down to 1.25 GFR is at 88 better compared to before we will continue to monitor with periodic labs rest of the chemistry is unremarkable

## 2025-01-16 NOTE — ASSESSMENT & PLAN NOTE
Lab Results   Component Value Date    HGBA1C 5.8 (H) 12/31/2024   Patient hemoglobin A1c is at 5.8 it was 5.6 before currently patient is on Mounjaro 10 mg subcutaneously weekly

## 2025-02-07 DIAGNOSIS — E11.9 TYPE 2 DIABETES MELLITUS WITHOUT COMPLICATION, WITHOUT LONG-TERM CURRENT USE OF INSULIN (HCC): ICD-10-CM

## 2025-02-07 NOTE — TELEPHONE ENCOUNTER
Reason for call:   [x] Refill   [] Prior Auth  [] Other:     Office:   [x] PCP/Provider - pc rin/leyla  [] Specialty/Provider -     Medication:   Tirzepatide (Mounjaro) 10 MG/0.5ML SOAJ     Dose/Frequency: Sig: Inject 10 mg under the skin once a week      Quantity: 2mL    Pharmacy:   [id number] [address]  Phone: [phone]  Fax: [phone]    Does the patient have enough for 3 days?   [x] Yes   [] No - Send as HP to POD

## 2025-02-08 RX ORDER — TIRZEPATIDE 10 MG/.5ML
10 INJECTION, SOLUTION SUBCUTANEOUS WEEKLY
Qty: 2 ML | Refills: 0 | Status: SHIPPED | OUTPATIENT
Start: 2025-02-08

## 2025-02-12 ENCOUNTER — RESULTS FOLLOW-UP (OUTPATIENT)
Dept: FAMILY MEDICINE CLINIC | Facility: CLINIC | Age: 45
End: 2025-02-12

## 2025-02-12 LAB — COLOGUARD RESULT REPORTABLE: NEGATIVE

## 2025-02-28 DIAGNOSIS — E11.9 TYPE 2 DIABETES MELLITUS WITHOUT COMPLICATION, WITHOUT LONG-TERM CURRENT USE OF INSULIN (HCC): ICD-10-CM

## 2025-03-03 RX ORDER — TIRZEPATIDE 10 MG/.5ML
10 INJECTION, SOLUTION SUBCUTANEOUS WEEKLY
Qty: 2 ML | Refills: 0 | Status: SHIPPED | OUTPATIENT
Start: 2025-03-03

## 2025-03-27 DIAGNOSIS — E11.9 TYPE 2 DIABETES MELLITUS WITHOUT COMPLICATION, WITHOUT LONG-TERM CURRENT USE OF INSULIN (HCC): ICD-10-CM

## 2025-03-27 DIAGNOSIS — N52.9 ERECTILE DYSFUNCTION, UNSPECIFIED ERECTILE DYSFUNCTION TYPE: ICD-10-CM

## 2025-03-27 RX ORDER — TIRZEPATIDE 10 MG/.5ML
10 INJECTION, SOLUTION SUBCUTANEOUS WEEKLY
Qty: 2 ML | Refills: 1 | Status: SHIPPED | OUTPATIENT
Start: 2025-03-27

## 2025-03-27 RX ORDER — SILDENAFIL 100 MG/1
100 TABLET, FILM COATED ORAL DAILY PRN
Qty: 10 TABLET | Refills: 2 | Status: SHIPPED | OUTPATIENT
Start: 2025-03-27

## 2025-04-21 DIAGNOSIS — E11.9 TYPE 2 DIABETES MELLITUS WITHOUT COMPLICATION, WITHOUT LONG-TERM CURRENT USE OF INSULIN (HCC): ICD-10-CM

## 2025-04-22 RX ORDER — TIRZEPATIDE 10 MG/.5ML
10 INJECTION, SOLUTION SUBCUTANEOUS WEEKLY
Qty: 2 ML | Refills: 0 | OUTPATIENT
Start: 2025-04-22

## 2025-05-10 DIAGNOSIS — E11.9 TYPE 2 DIABETES MELLITUS WITHOUT COMPLICATION, WITHOUT LONG-TERM CURRENT USE OF INSULIN (HCC): ICD-10-CM

## 2025-05-11 RX ORDER — TIRZEPATIDE 10 MG/.5ML
10 INJECTION, SOLUTION SUBCUTANEOUS WEEKLY
Qty: 2 ML | Refills: 0 | Status: SHIPPED | OUTPATIENT
Start: 2025-05-11 | End: 2025-05-19

## 2025-05-19 DIAGNOSIS — E11.9 TYPE 2 DIABETES MELLITUS WITHOUT COMPLICATION, WITHOUT LONG-TERM CURRENT USE OF INSULIN (HCC): ICD-10-CM

## 2025-05-19 RX ORDER — TIRZEPATIDE 10 MG/.5ML
INJECTION, SOLUTION SUBCUTANEOUS
Qty: 2 ML | Refills: 1 | Status: SHIPPED | OUTPATIENT
Start: 2025-05-19

## 2025-05-23 ENCOUNTER — OFFICE VISIT (OUTPATIENT)
Dept: FAMILY MEDICINE CLINIC | Facility: CLINIC | Age: 45
End: 2025-05-23
Payer: COMMERCIAL

## 2025-05-23 VITALS
BODY MASS INDEX: 33.75 KG/M2 | DIASTOLIC BLOOD PRESSURE: 72 MMHG | HEART RATE: 97 BPM | SYSTOLIC BLOOD PRESSURE: 120 MMHG | WEIGHT: 263 LBS | OXYGEN SATURATION: 96 % | HEIGHT: 74 IN

## 2025-05-23 DIAGNOSIS — E78.5 DYSLIPIDEMIA: ICD-10-CM

## 2025-05-23 DIAGNOSIS — M1A.09X0 CHRONIC GOUT OF MULTIPLE SITES, UNSPECIFIED CAUSE: ICD-10-CM

## 2025-05-23 DIAGNOSIS — E66.811 OBESITY (BMI 30.0-34.9): ICD-10-CM

## 2025-05-23 DIAGNOSIS — E11.9 TYPE 2 DIABETES MELLITUS WITHOUT COMPLICATION, WITHOUT LONG-TERM CURRENT USE OF INSULIN (HCC): Primary | ICD-10-CM

## 2025-05-23 DIAGNOSIS — F41.9 ANXIETY: ICD-10-CM

## 2025-05-23 DIAGNOSIS — N28.9 RENAL INSUFFICIENCY: ICD-10-CM

## 2025-05-23 DIAGNOSIS — Z11.4 SCREENING FOR HIV (HUMAN IMMUNODEFICIENCY VIRUS): ICD-10-CM

## 2025-05-23 DIAGNOSIS — Z13.29 SCREENING FOR THYROID DISORDER: ICD-10-CM

## 2025-05-23 DIAGNOSIS — Z13.0 SCREENING FOR DEFICIENCY ANEMIA: ICD-10-CM

## 2025-05-23 PROCEDURE — 99214 OFFICE O/P EST MOD 30 MIN: CPT | Performed by: INTERNAL MEDICINE

## 2025-05-23 RX ORDER — FEBUXOSTAT 80 MG/1
80 TABLET, FILM COATED ORAL DAILY
Qty: 90 TABLET | Refills: 1 | Status: SHIPPED | OUTPATIENT
Start: 2025-05-23

## 2025-05-23 NOTE — ASSESSMENT & PLAN NOTE
Currently patient is taking Mounjaro his BMI is 33.77 continue with the same he has lost total of 55 pounds in last 1 year emphasized regarding diet exercise lifestyle modification

## 2025-05-23 NOTE — ASSESSMENT & PLAN NOTE
Patient with anxiety disorder occasionally takes alprazolam we will continue with the same.  Stable

## 2025-05-23 NOTE — ASSESSMENT & PLAN NOTE
Patient is on Uloric 80 mg daily no episodes of any gout in the recent past continue with the same follow-up with uric acid level  Orders:  •  febuxostat (ULORIC) 80 MG TABS; Take 1 tablet (80 mg total) by mouth daily  •  Uric acid; Future

## 2025-05-23 NOTE — ASSESSMENT & PLAN NOTE
Lab Results   Component Value Date    HGBA1C 5.8 (H) 12/31/2024   A1c was 5.8 in December on Mounjaro continue with the same follow-up with repeat lab  Orders:  •  Comprehensive metabolic panel; Future  •  Hemoglobin A1C; Future  •  UA w Reflex to Microscopic w Reflex to Culture; Future  •  Albumin / creatinine urine ratio; Future

## 2025-05-23 NOTE — PROGRESS NOTES
Name: Erik White      : 1980      MRN: [id number]  Encounter Provider: Sylvester Mora MD  Encounter Date: 2025   Encounter department: [facility]  :  Assessment & Plan  Type 2 diabetes mellitus without complication, without long-term current use of insulin (HCC)    Lab Results   Component Value Date    HGBA1C 5.8 (H) 2024   A1c was 5.8 in December on Mounjaro continue with the same follow-up with repeat lab  Orders:  •  Comprehensive metabolic panel; Future  •  Hemoglobin A1C; Future  •  UA w Reflex to Microscopic w Reflex to Culture; Future  •  Albumin / creatinine urine ratio; Future    Obesity (BMI 30.0-34.9)  Currently patient is taking Mounjaro his BMI is 33.77 continue with the same he has lost total of 55 pounds in last 1 year emphasized regarding diet exercise lifestyle modification       Chronic gout of multiple sites, unspecified cause  Patient is on Uloric 80 mg daily no episodes of any gout in the recent past continue with the same follow-up with uric acid level  Orders:  •  febuxostat (ULORIC) 80 MG TABS; Take 1 tablet (80 mg total) by mouth daily  •  Uric acid; Future    Anxiety  Patient with anxiety disorder occasionally takes alprazolam we will continue with the same.  Stable       Renal insufficiency  Last GFR is at 88 much better follow-up with repeat lab       Screening for thyroid disorder    Orders:  •  TSH, 3rd generation with Free T4 reflex; Future    Screening for deficiency anemia    Orders:  •  CBC and differential; Future    Dyslipidemia    Orders:  •  Lipid panel; Future    Screening for HIV (human immunodeficiency virus)    Orders:  •  HIV 1/2 AG/AB w Reflex [facility] for 2 yr old and above; Future          Depression Screening and Follow-up Plan: Patient was screened for depression during today's encounter. They screened negative with a PHQ-2 score of 0.        History of Present Illness   Patient is coming here for a follow-up evaluation with  "regards to symptoms of diabetes, history of gout history of anxiety disorder history of low back pain doing much better.  Patient has been also on Mounjaro injection for his diabetes and obesity he has lost about 55 pounds in the last several months.  No other acute medical problems at present time.  No chest pain palpitation shortness of breath.    No history of gout attacks in the recent past medications reviewed labs reviewed patient is due for his 6-month lab check we will order the same      Review of Systems   Constitutional:  Negative for chills and fever.   HENT:  Negative for ear pain and sore throat.    Eyes:  Negative for pain and visual disturbance.   Respiratory:  Negative for cough and shortness of breath.    Cardiovascular:  Negative for chest pain and palpitations.   Gastrointestinal:  Negative for abdominal pain and vomiting.   Genitourinary:  Negative for dysuria and hematuria.   Musculoskeletal:  Negative for arthralgias and back pain.   Skin:  Negative for color change and rash.   Neurological:  Negative for seizures and syncope.   All other systems reviewed and are negative.      Objective   /72 (BP Location: Right arm, Patient Position: Sitting, Cuff Size: Large)   Pulse 97   Ht 6' 2\" (1.88 m)   Wt 119 kg (263 lb)   SpO2 96%   BMI 33.77 kg/m²      Physical Exam  Vitals and nursing note reviewed.   Constitutional:       General: He is not in acute distress.     Appearance: He is well-developed.   HENT:      Head: Normocephalic and atraumatic.     Eyes:      Conjunctiva/sclera: Conjunctivae normal.       Cardiovascular:      Rate and Rhythm: Normal rate and regular rhythm.      Pulses: no weak pulses.           Dorsalis pedis pulses are 1+ on the right side and 1+ on the left side.        Posterior tibial pulses are 1+ on the right side and 1+ on the left side.      Heart sounds: No murmur heard.  Pulmonary:      Effort: Pulmonary effort is normal. No respiratory distress.      Breath " sounds: Normal breath sounds.   Abdominal:      Palpations: Abdomen is soft.      Tenderness: There is no abdominal tenderness.     Musculoskeletal:         General: No swelling.      Cervical back: Neck supple.   Feet:      Right foot:      Skin integrity: No ulcer, skin breakdown, erythema, warmth, callus or dry skin.      Left foot:      Skin integrity: No ulcer, skin breakdown, erythema, warmth, callus or dry skin.     Skin:     General: Skin is warm and dry.      Capillary Refill: Capillary refill takes less than 2 seconds.     Neurological:      Mental Status: He is alert.     Psychiatric:         Mood and Affect: Mood normal.       Diabetic Foot Exam    Patient's shoes and socks removed.    Right Foot/Ankle   Right Foot Inspection  Skin Exam: skin normal and skin intact. No dry skin, no warmth, no callus, no erythema, no maceration, no abnormal color, no pre-ulcer, no ulcer and no callus.     Toe Exam: ROM and strength within normal limits.     Sensory   Monofilament testing: intact    Vascular  The right DP pulse is 1+. The right PT pulse is 1+.     Left Foot/Ankle  Left Foot Inspection  Skin Exam: skin normal and skin intact. No dry skin, no warmth, no erythema, no maceration, normal color, no pre-ulcer, no ulcer and no callus.     Toe Exam: ROM and strength within normal limits.     Sensory   Monofilament testing: intact    Vascular  The left DP pulse is 1+. The left PT pulse is 1+.     Assign Risk Category  No deformity present  No loss of protective sensation  No weak pulses  Risk: 0

## 2025-05-24 ENCOUNTER — APPOINTMENT (OUTPATIENT)
Dept: LAB | Facility: CLINIC | Age: 45
End: 2025-05-24
Attending: INTERNAL MEDICINE
Payer: COMMERCIAL

## 2025-05-24 DIAGNOSIS — M1A.09X0 CHRONIC GOUT OF MULTIPLE SITES, UNSPECIFIED CAUSE: ICD-10-CM

## 2025-05-24 DIAGNOSIS — E11.9 TYPE 2 DIABETES MELLITUS WITHOUT COMPLICATION, WITHOUT LONG-TERM CURRENT USE OF INSULIN (HCC): ICD-10-CM

## 2025-05-24 DIAGNOSIS — Z13.29 SCREENING FOR THYROID DISORDER: ICD-10-CM

## 2025-05-24 DIAGNOSIS — Z11.4 SCREENING FOR HIV (HUMAN IMMUNODEFICIENCY VIRUS): ICD-10-CM

## 2025-05-24 DIAGNOSIS — E78.5 DYSLIPIDEMIA: ICD-10-CM

## 2025-05-24 DIAGNOSIS — Z13.0 SCREENING FOR DEFICIENCY ANEMIA: ICD-10-CM

## 2025-05-24 LAB
ALBUMIN SERPL BCG-MCNC: 4.7 G/DL (ref 3.5–5)
ALP SERPL-CCNC: 75 U/L (ref 34–104)
ALT SERPL W P-5'-P-CCNC: 23 U/L (ref 7–52)
ANION GAP SERPL CALCULATED.3IONS-SCNC: 6 MMOL/L (ref 4–13)
AST SERPL W P-5'-P-CCNC: 21 U/L (ref 13–39)
BASOPHILS # BLD AUTO: 0.03 THOUSANDS/ÂΜL (ref 0–0.1)
BASOPHILS NFR BLD AUTO: 0 % (ref 0–1)
BILIRUB SERPL-MCNC: 0.47 MG/DL (ref 0.2–1)
BILIRUB UR QL STRIP: NEGATIVE
BUN SERPL-MCNC: 21 MG/DL (ref 5–25)
CALCIUM SERPL-MCNC: 9.6 MG/DL (ref 8.4–10.2)
CHLORIDE SERPL-SCNC: 106 MMOL/L (ref 96–108)
CHOLEST SERPL-MCNC: 175 MG/DL (ref ?–200)
CLARITY UR: CLEAR
CO2 SERPL-SCNC: 30 MMOL/L (ref 21–32)
COLOR UR: NORMAL
CREAT SERPL-MCNC: 1.41 MG/DL (ref 0.6–1.3)
CREAT UR-MCNC: 183.6 MG/DL
EOSINOPHIL # BLD AUTO: 0.12 THOUSAND/ÂΜL (ref 0–0.61)
EOSINOPHIL NFR BLD AUTO: 1 % (ref 0–6)
ERYTHROCYTE [DISTWIDTH] IN BLOOD BY AUTOMATED COUNT: 12.2 % (ref 11.6–15.1)
EST. AVERAGE GLUCOSE BLD GHB EST-MCNC: 117 MG/DL
GFR SERPL CREATININE-BSD FRML MDRD: 59 ML/MIN/1.73SQ M
GLUCOSE P FAST SERPL-MCNC: 106 MG/DL (ref 65–99)
GLUCOSE UR STRIP-MCNC: NEGATIVE MG/DL
HBA1C MFR BLD: 5.7 %
HCT VFR BLD AUTO: 44.1 % (ref 36.5–49.3)
HDLC SERPL-MCNC: 53 MG/DL
HGB BLD-MCNC: 14.5 G/DL (ref 12–17)
HGB UR QL STRIP.AUTO: NEGATIVE
HIV 1+2 AB+HIV1 P24 AG SERPL QL IA: NORMAL
IMM GRANULOCYTES # BLD AUTO: 0.04 THOUSAND/UL (ref 0–0.2)
IMM GRANULOCYTES NFR BLD AUTO: 1 % (ref 0–2)
KETONES UR STRIP-MCNC: NEGATIVE MG/DL
LDLC SERPL CALC-MCNC: 93 MG/DL (ref 0–100)
LEUKOCYTE ESTERASE UR QL STRIP: NEGATIVE
LYMPHOCYTES # BLD AUTO: 2.32 THOUSANDS/ÂΜL (ref 0.6–4.47)
LYMPHOCYTES NFR BLD AUTO: 27 % (ref 14–44)
MCH RBC QN AUTO: 30.7 PG (ref 26.8–34.3)
MCHC RBC AUTO-ENTMCNC: 32.9 G/DL (ref 31.4–37.4)
MCV RBC AUTO: 93 FL (ref 82–98)
MICROALBUMIN UR-MCNC: 11 MG/L
MICROALBUMIN/CREAT 24H UR: 6 MG/G CREATININE (ref 0–30)
MONOCYTES # BLD AUTO: 1.08 THOUSAND/ÂΜL (ref 0.17–1.22)
MONOCYTES NFR BLD AUTO: 13 % (ref 4–12)
NEUTROPHILS # BLD AUTO: 4.91 THOUSANDS/ÂΜL (ref 1.85–7.62)
NEUTS SEG NFR BLD AUTO: 58 % (ref 43–75)
NITRITE UR QL STRIP: NEGATIVE
NONHDLC SERPL-MCNC: 122 MG/DL
NRBC BLD AUTO-RTO: 0 /100 WBCS
PH UR STRIP.AUTO: 5 [PH]
PLATELET # BLD AUTO: 174 THOUSANDS/UL (ref 149–390)
PMV BLD AUTO: 11.6 FL (ref 8.9–12.7)
POTASSIUM SERPL-SCNC: 3.9 MMOL/L (ref 3.5–5.3)
PROT SERPL-MCNC: 6.7 G/DL (ref 6.4–8.4)
PROT UR STRIP-MCNC: NEGATIVE MG/DL
RBC # BLD AUTO: 4.72 MILLION/UL (ref 3.88–5.62)
SODIUM SERPL-SCNC: 142 MMOL/L (ref 135–147)
SP GR UR STRIP.AUTO: 1.02 (ref 1–1.03)
TRIGL SERPL-MCNC: 143 MG/DL (ref ?–150)
TSH SERPL DL<=0.05 MIU/L-ACNC: 1.6 UIU/ML (ref 0.45–4.5)
URATE SERPL-MCNC: 6.3 MG/DL (ref 3.5–8.5)
UROBILINOGEN UR STRIP-ACNC: <2 MG/DL
WBC # BLD AUTO: 8.5 THOUSAND/UL (ref 4.31–10.16)

## 2025-05-24 PROCEDURE — 82043 UR ALBUMIN QUANTITATIVE: CPT

## 2025-05-24 PROCEDURE — 85025 COMPLETE CBC W/AUTO DIFF WBC: CPT

## 2025-05-24 PROCEDURE — 82570 ASSAY OF URINE CREATININE: CPT

## 2025-05-24 PROCEDURE — 36415 COLL VENOUS BLD VENIPUNCTURE: CPT

## 2025-05-24 PROCEDURE — 84550 ASSAY OF BLOOD/URIC ACID: CPT

## 2025-05-24 PROCEDURE — 83036 HEMOGLOBIN GLYCOSYLATED A1C: CPT

## 2025-05-24 PROCEDURE — 84443 ASSAY THYROID STIM HORMONE: CPT

## 2025-05-24 PROCEDURE — 81003 URINALYSIS AUTO W/O SCOPE: CPT

## 2025-05-24 PROCEDURE — 80053 COMPREHEN METABOLIC PANEL: CPT

## 2025-05-24 PROCEDURE — 80061 LIPID PANEL: CPT

## 2025-05-24 PROCEDURE — 87389 HIV-1 AG W/HIV-1&-2 AB AG IA: CPT

## 2025-06-01 DIAGNOSIS — M1A.09X0 CHRONIC GOUT OF MULTIPLE SITES, UNSPECIFIED CAUSE: ICD-10-CM

## 2025-06-01 RX ORDER — FEBUXOSTAT 80 MG/1
80 TABLET, FILM COATED ORAL DAILY
Qty: 90 TABLET | Refills: 1 | Status: SHIPPED | OUTPATIENT
Start: 2025-06-01

## 2025-06-11 DIAGNOSIS — E11.9 TYPE 2 DIABETES MELLITUS WITHOUT COMPLICATION, WITHOUT LONG-TERM CURRENT USE OF INSULIN (HCC): ICD-10-CM

## 2025-06-11 DIAGNOSIS — N52.9 ERECTILE DYSFUNCTION, UNSPECIFIED ERECTILE DYSFUNCTION TYPE: ICD-10-CM

## 2025-06-12 RX ORDER — TIRZEPATIDE 10 MG/.5ML
INJECTION, SOLUTION SUBCUTANEOUS
Qty: 2 ML | Refills: 1 | Status: SHIPPED | OUTPATIENT
Start: 2025-06-12

## 2025-06-12 RX ORDER — SILDENAFIL 100 MG/1
100 TABLET, FILM COATED ORAL DAILY PRN
Qty: 10 TABLET | Refills: 1 | Status: SHIPPED | OUTPATIENT
Start: 2025-06-12

## 2025-07-01 DIAGNOSIS — E11.9 TYPE 2 DIABETES MELLITUS WITHOUT COMPLICATION, WITHOUT LONG-TERM CURRENT USE OF INSULIN (HCC): ICD-10-CM

## 2025-07-02 RX ORDER — TIRZEPATIDE 10 MG/.5ML
INJECTION, SOLUTION SUBCUTANEOUS
Qty: 2 ML | Refills: 0 | OUTPATIENT
Start: 2025-07-02